# Patient Record
Sex: FEMALE | Race: BLACK OR AFRICAN AMERICAN | NOT HISPANIC OR LATINO | Employment: FULL TIME | URBAN - METROPOLITAN AREA
[De-identification: names, ages, dates, MRNs, and addresses within clinical notes are randomized per-mention and may not be internally consistent; named-entity substitution may affect disease eponyms.]

---

## 2017-03-27 ENCOUNTER — HOSPITAL ENCOUNTER (EMERGENCY)
Facility: CLINIC | Age: 27
Discharge: HOME | End: 2017-03-27
Attending: PHYSICIAN ASSISTANT | Admitting: PHYSICIAN ASSISTANT
Payer: COMMERCIAL

## 2017-03-27 VITALS — SYSTOLIC BLOOD PRESSURE: 127 MMHG | TEMPERATURE: 98.5 F | DIASTOLIC BLOOD PRESSURE: 91 MMHG | OXYGEN SATURATION: 99 %

## 2017-03-27 DIAGNOSIS — A59.01 TRICHOMONAL VAGINITIS: ICD-10-CM

## 2017-03-27 DIAGNOSIS — Z76.0 ENCOUNTER FOR MEDICATION REFILL: ICD-10-CM

## 2017-03-27 LAB
ALBUMIN UR-MCNC: NEGATIVE MG/DL
APPEARANCE UR: ABNORMAL
BACTERIA #/AREA URNS HPF: ABNORMAL /HPF
BILIRUB UR QL STRIP: NEGATIVE
COLOR UR AUTO: ABNORMAL
GLUCOSE UR STRIP-MCNC: NEGATIVE MG/DL
HCG UR QL: NEGATIVE
HGB UR QL STRIP: ABNORMAL
KETONES UR STRIP-MCNC: NEGATIVE MG/DL
LEUKOCYTE ESTERASE UR QL STRIP: ABNORMAL
MICRO REPORT STATUS: ABNORMAL
NITRATE UR QL: NEGATIVE
PH UR STRIP: 7 PH (ref 5–7)
RBC #/AREA URNS AUTO: 2 /HPF (ref 0–2)
SP GR UR STRIP: 1.01 (ref 1–1.03)
SPECIMEN SOURCE: ABNORMAL
SQUAMOUS #/AREA URNS AUTO: 17 /HPF (ref 0–1)
URN SPEC COLLECT METH UR: ABNORMAL
UROBILINOGEN UR STRIP-MCNC: NORMAL MG/DL (ref 0–2)
WBC #/AREA URNS AUTO: 16 /HPF (ref 0–2)
WET PREP SPEC: ABNORMAL

## 2017-03-27 PROCEDURE — 87210 SMEAR WET MOUNT SALINE/INK: CPT | Performed by: PHYSICIAN ASSISTANT

## 2017-03-27 PROCEDURE — 87591 N.GONORRHOEAE DNA AMP PROB: CPT | Performed by: PHYSICIAN ASSISTANT

## 2017-03-27 PROCEDURE — 96372 THER/PROPH/DIAG INJ SC/IM: CPT

## 2017-03-27 PROCEDURE — 87491 CHLMYD TRACH DNA AMP PROBE: CPT | Performed by: PHYSICIAN ASSISTANT

## 2017-03-27 PROCEDURE — 81001 URINALYSIS AUTO W/SCOPE: CPT | Performed by: PHYSICIAN ASSISTANT

## 2017-03-27 PROCEDURE — 25000128 H RX IP 250 OP 636: Performed by: PHYSICIAN ASSISTANT

## 2017-03-27 PROCEDURE — 81025 URINE PREGNANCY TEST: CPT | Performed by: PHYSICIAN ASSISTANT

## 2017-03-27 PROCEDURE — 25000125 ZZHC RX 250: Performed by: PHYSICIAN ASSISTANT

## 2017-03-27 PROCEDURE — 99284 EMERGENCY DEPT VISIT MOD MDM: CPT | Mod: 25

## 2017-03-27 PROCEDURE — 25000132 ZZH RX MED GY IP 250 OP 250 PS 637: Performed by: PHYSICIAN ASSISTANT

## 2017-03-27 RX ORDER — AZITHROMYCIN 250 MG/1
1000 TABLET, FILM COATED ORAL ONCE
Status: COMPLETED | OUTPATIENT
Start: 2017-03-27 | End: 2017-03-27

## 2017-03-27 RX ORDER — CEFTRIAXONE SODIUM 1 G
250 VIAL (EA) INJECTION ONCE
Status: DISCONTINUED | OUTPATIENT
Start: 2017-03-27 | End: 2017-03-27

## 2017-03-27 RX ORDER — METRONIDAZOLE 500 MG/1
2000 TABLET ORAL ONCE
Status: COMPLETED | OUTPATIENT
Start: 2017-03-27 | End: 2017-03-27

## 2017-03-27 RX ORDER — METRONIDAZOLE 7.5 MG/G
GEL TOPICAL 2 TIMES DAILY
Qty: 45 G | Refills: 0 | Status: SHIPPED | OUTPATIENT
Start: 2017-03-27 | End: 2017-04-24

## 2017-03-27 RX ADMIN — LIDOCAINE HYDROCHLORIDE 250 MG: 10 INJECTION, SOLUTION EPIDURAL; INFILTRATION; INTRACAUDAL; PERINEURAL at 17:09

## 2017-03-27 RX ADMIN — AZITHROMYCIN 1000 MG: 250 TABLET, FILM COATED ORAL at 17:09

## 2017-03-27 RX ADMIN — METRONIDAZOLE 2000 MG: 500 TABLET ORAL at 17:09

## 2017-03-27 ASSESSMENT — ENCOUNTER SYMPTOMS
FLANK PAIN: 0
FEVER: 0
DYSURIA: 0
NAUSEA: 0
BACK PAIN: 0
CHILLS: 0
COUGH: 0
ABDOMINAL PAIN: 0
VOMITING: 0
DIARRHEA: 0
HEMATURIA: 1
DIFFICULTY URINATING: 0

## 2017-03-27 NOTE — ED AVS SNAPSHOT
Emergency Department    [address] [place] [address]    Phone:  [phone]    Fax:  [phone]                                       Elissa Avila   MRN: [id number]    Department:   Emergency Department   Date of Visit:  3/27/2017           Patient Information     Date Of Birth          1990        Your diagnoses for this visit were:     Trichomonal vaginitis     Encounter for medication refill        You were seen by Jannet Chapa PA-C.      Follow-up Information     Follow up with Your clinic. Schedule an appointment as soon as possible for a visit in 1 week.    Why:  If symptoms persist         Discharge Instructions         Trichomonas Vaginal Infection (Trichomoniasis)    You have a Trichomonas vaginal infection. This problem is often called  trich.  It is caused by a parasite that is passed during sex. This makes trich a sexually transmitted disease (STD). Both men and women can get trich, but it is more common in women.  Most people who have trich don t have any symptoms at first. If symptoms do occur, they may take weeks or months to develop.  Symptoms in women can include:    Thin discharge from the vagina that may smell bad and be clear, white, gray, green, or yellow in color    Itching, burning, redness, or soreness in or around the vagina    Pain in the lower belly    Frequent urination or pain and burning during urination    Pain during sex  Symptoms in men are not very common. Symptoms in men are not very common. Men may have trich and pass it to women during sex without knowing they were ever infected.  Trich is most often trated with anbiotics. Without treatment, trich can increase the risk of more serious health problems such as:    Pelvic inflammatory disease (PID)     delivery (giving birth to a baby early if you re pregnant)    HIV and certain other sexually transmitted diseases (STDs)  Home care    Take the antibiotics you re prescribed  exactly as directed. Finish all of the medicine, even if your symptoms go away.    Avoid drinking alcohol until you re done with your treatment.    Tell any partners you have sex with that you have trich. They will need be tested for trich and possibly treated as well.    Avoid having sex until you and any partners you have sex with are confirmed to no longer have trich.  Prevention  The only way to avoid getting trich or any other STD is to avoid having sex. If you choose to have sex, then take steps to lower your health risks:    Use condoms when having sex.    Limit the number of partners you have sex with.    Get tested regularly for STDs. Ask any partner you have sex with to do the same.    Don t have sex with anyone who has symptoms that may be due to an STD.  Follow-up care  Follow up with your healthcare provider, or as advised. Testing will likely be done to ensure that the infection has cleared.  When to seek medical advice  Call your healthcare provider right away if:    You have a fever of 100.4 F (38 C) or higher, or as directed by your provider.    Your symptoms worsen, or they don t go away even after completing your treatment.    You have new pain in the lower belly or pelvic region.    You have side effects that bother you or a reaction to the medicine you re taking.    You or any partners you have sex with have new symptoms, such as a rash, joint pain, or sores.    3785-1854 [employer]. [address] [place] [address]. All rights reserved. This information is not intended as a substitute for professional medical care. Always follow your healthcare professional's instructions.          24 Hour Appointment Hotline       To make an appointment at any [facility] (formerly [facility]3], call [phone] ([phone]). If you don't have a family doctor or clinic, we will help you find one. [facility] are conveniently located to serve the needs of you and your family.             Review  of your medicines      START taking        Dose / Directions Last dose taken    metroNIDAZOLE 0.75 % topical gel   Commonly known as:  METROGEL   Quantity:  45 g        Apply topically 2 times daily For facial rash   Refills:  0          Our records show that you are taking the medicines listed below. If these are incorrect, please call your family doctor or clinic.        Dose / Directions Last dose taken    prenatal multivitamin  plus iron 27-0.8 MG Tabs per tablet   Dose:  1 tablet   Indication:  Pregnancy        Take 1 tablet by mouth daily   Refills:  0                Prescriptions were sent or printed at these locations (1 Prescription)                   Other Prescriptions                Printed at Department/Unit printer (1 of 1)         metroNIDAZOLE (METROGEL) 0.75 % topical gel                Procedures and tests performed during your visit     Chlamydia trachomatis PCR    HCG qualitative urine    Neisseria gonorrhoeae PCR    UA with Microscopic    Wet prep      Orders Needing Specimen Collection     None      Pending Results     Date and Time Order Name Status Description    3/27/2017 1518 Chlamydia trachomatis PCR In process     3/27/2017 1448 Neisseria gonorrhoeae PCR In process             Pending Culture Results     Date and Time Order Name Status Description    3/27/2017 1518 Chlamydia trachomatis PCR In process     3/27/2017 1448 Neisseria gonorrhoeae PCR In process              Test Results from your hospital stay     3/27/2017  3:12 PM - Interface, [facility] Results      Component Results     Component Value Ref Range & Units Status    Color Urine Light Yellow  Final    Appearance Urine Slightly Cloudy  Final    Glucose Urine Negative NEG mg/dL Final    Bilirubin Urine Negative NEG Final    Ketones Urine Negative NEG mg/dL Final    Specific Gravity Urine 1.007 1.003 - 1.035 Final    Blood Urine Trace (A) NEG Final    pH Urine 7.0 5.0 - 7.0 pH Final    Protein Albumin Urine Negative NEG mg/dL Final     Urobilinogen mg/dL Normal 0.0 - 2.0 mg/dL Final    Nitrite Urine Negative NEG Final    Leukocyte Esterase Urine Moderate (A) NEG Final    Source Midstream Urine  Final    WBC Urine 16 (H) 0 - 2 /HPF Final    RBC Urine 2 0 - 2 /HPF Final    Bacteria Urine Few (A) NEG /HPF Final    Squamous Epithelial /HPF Urine 17 (H) 0 - 1 /HPF Final         3/27/2017  4:07 PM - Interface, [employer] Results      Component Results     Component    Specimen Description    Vagina    Wet Prep (Abnormal)    Trichomonas seen  No yeast seen  No clue cells seen  Few PMNs seen      Micro Report Status    FINAL 03/27/2017         3/27/2017  3:06 PM - Interface, [facility] Results      Component Results     Component Value Ref Range & Units Status    HCG Qual Urine Negative NEG Final         3/27/2017  3:42 PM - Interface, [facility] Results         3/27/2017  3:42 PM - Interface, [facility] Results                Clinical Quality Measure: Blood Pressure Screening     Your blood pressure was checked while you were in the emergency department today. The last reading we obtained was  BP: (!) 127/91 . Please read the guidelines below about what these numbers mean and what you should do about them.  If your systolic blood pressure (the top number) is less than 120 and your diastolic blood pressure (the bottom number) is less than 80, then your blood pressure is normal. There is nothing more that you need to do about it.  If your systolic blood pressure (the top number) is 120-139 or your diastolic blood pressure (the bottom number) is 80-89, your blood pressure may be higher than it should be. You should have your blood pressure rechecked within a year by a primary care provider.  If your systolic blood pressure (the top number) is 140 or greater or your diastolic blood pressure (the bottom number) is 90 or greater, you may have high blood pressure. High blood pressure is treatable, but if left untreated over time it can put you at risk for heart  "attack, stroke, or kidney failure. You should have your blood pressure rechecked by a primary care provider within the next 4 weeks.  If your provider in the emergency department today gave you specific instructions to follow-up with your doctor or provider even sooner than that, you should follow that instruction and not wait for up to 4 weeks for your follow-up visit.        Thank you for choosing [facility]       Thank you for choosing [facility] for your care. Our goal is always to provide you with excellent care. Hearing back from our patients is one way we can continue to improve our services. Please take a few minutes to complete the written survey that you may receive in the mail after you visit with us. Thank you!        [employer] Information     [employer] lets you send messages to your doctor, view your test results, renew your prescriptions, schedule appointments and more. To sign up, go to [email or web address] . Click on \"Log in\" on the left side of the screen, which will take you to the Welcome page. Then click on \"Sign up Now\" on the right side of the page.     You will be asked to enter the access code listed below, as well as some personal information. Please follow the directions to create your username and password.     Your access code is: [email or web address]  Expires: 2017  4:47 PM     Your access code will  in 90 days. If you need help or a new code, please call your [facility] clinic or [phone].        Care EveryWhere ID     This is your Care EveryWhere ID. This could be used by other organizations to access your [facility] medical records  [id number]        After Visit Summary       This is your record. Keep this with you and show to your community pharmacist(s) and doctor(s) at your next visit.                  "

## 2017-03-27 NOTE — ED PROVIDER NOTES
History     Chief Complaint:  Medication Refill and Urinary Symptoms      HPI   Elissa Avila is a 26 year old female who presents with urinary symptoms and concern bacterial infection, as well as request for medical refill. The patient reports that she has a history of 3 UTIs, most recently in February of this year when she was started on Macrobid and Pyridium. The patient reports that her symptoms went away after antibiotics. She reports that she has had some urinary symptoms and pain with intercourse over the last few days and is concerned about another infection, prompting her to come to the ED for evaluation. On arrival to the ED, the patient reports that she has been having strong smelling urine, pain with intercourse, and intermittent hematuria. The patient denies any pain or burning with urination. She denies any abdominal pain, flank pain, fever, chills, or vaginal discharge.     The patient also states that she was prescribed a cream for an facial rash (Metrogel) about a month ago at [facility]. She states that the rash was improving but she accidentally lost the prescription while they were moving and needs a refill.    Allergies:  No known drug allergies     Medications:    Lithium     Past Medical History:    ASCUS  Depression    Past Surgical History:    History reviewed. No pertinent surgical history.    Family History:    History reviewed. No pertinent family history.     Social History:  Smoking status: Current some day smoker  Alcohol use: No  Marital Status:  Single [1]     Review of Systems   Constitutional: Negative for chills and fever.   Respiratory: Negative for cough.    Gastrointestinal: Negative for abdominal pain, diarrhea, nausea and vomiting.   Genitourinary: Positive for dyspareunia and hematuria. Negative for difficulty urinating, dysuria, flank pain, pelvic pain, vaginal discharge and vaginal pain.        Strong smelling urine    Musculoskeletal: Negative for back  pain.   Skin: Negative for rash.   All other systems reviewed and are negative.      Physical Exam     Patient Vitals for the past 24 hrs:   BP Temp Temp src Heart Rate SpO2   03/27/17 1433 (!) 127/91 98.5  F (36.9  C) Oral 98 99 %     Physical Exam  General: Alert, interactive, appears comfortable    Eye:  Pupils are equal, round, and reactive.      ENT:     No rhinorrhea.     Moist mucus membranes.      Neck: No rigidity.              Cardiac:  Regular rate and rhythm. S1, S2.  No murmurs, gallops, or rubs.    Pulmonary:  Clear to auscultation bilaterally.  No wheezes or crackles.             Non labored. No use of accessory muscles.     Abdomen:  Abdomen is soft and non-distended, without focal tenderness.     Pelvic exam: no external genitalia lesions. Small amount of creamy odorous vaginal discharge in the vaginal vault. No CMT or adnexal tenderness on bimanual exam.     Musculoskeletal:  Freely moveable extremities    Skin:  Warm and dry. Slightly raised skin colored rash on the left zygomatic region. No vesicular lesions or erythema.     Neurologic:  Non-focal exam without asymmetric weakness or numbness.  GCS 15    Psychiatric:  Awake. Normal affect with appropriate interaction with examiner.      Emergency Department Course   Laboratory:  UA: Blood trace, Leukocyte esterase moderate, WBC 16(H), Bacteria few, Squamous epithelial 17(H), o/w negative  HCG qual: Negative     Wet prep: Trichomonas seen, no yeast seen, no clue cells seen, Few PMNs seen.   Gonorrhea: in process  Chlamydia: in process    Interventions:  1709: Flagyl 2,000 mg oral  1709: Azithromycin 1,000 mg oral  1709: Rocephin 250 mg IM    Emergency Department Course:  Past medical records, nursing notes, and vitals reviewed.  1443: I performed an exam of the patient and obtained history, as documented above.  UA sent and pelvic exam performed, results above.     1533: I rechecked the patient. Explained findings to the patient.  1651: I rechecked  the patient. Explained findings to the patient.    I rechecked the patient.  Findings and plan explained to the Patient. Patient discharged home with instructions regarding supportive care, medications, and reasons to return. The importance of close follow-up was reviewed.     Impression & Plan      Medical Decision Making:  This is a 26 year old female who presents for evaluation of urinary symptoms and request of a medication refill and found to be positive for vaginal trichomonas. On exam, the patient is afebrile and clinically well appearing. Given that she reported pelvic pain with intercourse I did complete a pelvic exam on which there was an odorous visible discharge present in the vaginal canal. Wet prep is positive for trichomonas. Given this I recommended treatment for this patient also including gonorrhea and chlamydia as she states she was unaware that she had been exposed. The patient was given Flagyl, Azithromycin, and Ceftriaxone. Formal gonorrhea and chlamydia testing are currently pending. Her urine analysis appears contaminated with squamous cells and at this point I would not treat her urinalysis given her other findings. The patient is not pregnant. At this point there does not appear to be complications such as PID or underlying sepsis. I told the patient she should remain abstinent for 2 weeks and her partner should be treated as well. I also told her that I also recommend getting tested for HIV and hepatitis as these are also sexually transmitted infection but not tested for here in the Emergency Department. Finally, the patient also requested a refill of her topical Metronidazole which I gave her a refill for. Per care everywhere she received treatment for this for suspected rosacea. The patient is discharged home.     Diagnosis:    ICD-10-CM   1. Trichomonal vaginitis A59.01   2. Encounter for medication refill Z76.0     Disposition: Discharged to home    Discharge Medications:   Details    metroNIDAZOLE (METROGEL) 0.75 % topical gel Apply topically 2 times daily For facial rashDisp-45 g, R-0Local Print     Veronica Angeles  3/27/2017    EMERGENCY DEPARTMENT    I, Veronica Angeles, am serving as a scribe at 2:43 PM on 3/27/2017 to document services personally performed by Jannet Chapa PA based on my observations and the provider's statements to me.        Jannet Chapa PA-MARGARITA  03/27/17 2032

## 2017-03-27 NOTE — DISCHARGE INSTRUCTIONS
Trichomonas Vaginal Infection (Trichomoniasis)    You have a Trichomonas vaginal infection. This problem is often called  trich.  It is caused by a parasite that is passed during sex. This makes trich a sexually transmitted disease (STD). Both men and women can get trich, but it is more common in women.  Most people who have trich don t have any symptoms at first. If symptoms do occur, they may take weeks or months to develop.  Symptoms in women can include:    Thin discharge from the vagina that may smell bad and be clear, white, gray, green, or yellow in color    Itching, burning, redness, or soreness in or around the vagina    Pain in the lower belly    Frequent urination or pain and burning during urination    Pain during sex  Symptoms in men are not very common. Symptoms in men are not very common. Men may have trich and pass it to women during sex without knowing they were ever infected.  Trich is most often trated with anbiotics. Without treatment, trich can increase the risk of more serious health problems such as:    Pelvic inflammatory disease (PID)     delivery (giving birth to a baby early if you re pregnant)    HIV and certain other sexually transmitted diseases (STDs)  Home care    Take the antibiotics you re prescribed exactly as directed. Finish all of the medicine, even if your symptoms go away.    Avoid drinking alcohol until you re done with your treatment.    Tell any partners you have sex with that you have trich. They will need be tested for trich and possibly treated as well.    Avoid having sex until you and any partners you have sex with are confirmed to no longer have trich.  Prevention  The only way to avoid getting trich or any other STD is to avoid having sex. If you choose to have sex, then take steps to lower your health risks:    Use condoms when having sex.    Limit the number of partners you have sex with.    Get tested regularly for STDs. Ask any partner you have sex with  to do the same.    Don t have sex with anyone who has symptoms that may be due to an STD.  Follow-up care  Follow up with your healthcare provider, or as advised. Testing will likely be done to ensure that the infection has cleared.  When to seek medical advice  Call your healthcare provider right away if:    You have a fever of 100.4 F (38 C) or higher, or as directed by your provider.    Your symptoms worsen, or they don t go away even after completing your treatment.    You have new pain in the lower belly or pelvic region.    You have side effects that bother you or a reaction to the medicine you re taking.    You or any partners you have sex with have new symptoms, such as a rash, joint pain, or sores.    9431-0465 [employer]. [address] [place] [address], [place] [address]. All rights reserved. This information is not intended as a substitute for professional medical care. Always follow your healthcare professional's instructions.

## 2017-03-27 NOTE — ED AVS SNAPSHOT
Emergency Department    [address] [place] [address]    Phone:  [phone]    Fax:  [phone]                                       Elissa Avila   MRN: [id number]    Department:   Emergency Department   Date of Visit:  3/27/2017           After Visit Summary Signature Page     I have received my discharge instructions, and my questions have been answered. I have discussed any challenges I see with this plan with the nurse or doctor.    ..........................................................................................................................................  Patient/Patient Representative Signature      ..........................................................................................................................................  Patient Representative Print Name and Relationship to Patient    ..................................................               ................................................  Date                                            Time    ..........................................................................................................................................  Reviewed by Signature/Title    ...................................................              ..............................................  Date                                                            Time

## 2017-03-28 LAB
C TRACH DNA SPEC QL NAA+PROBE: NORMAL
N GONORRHOEA DNA SPEC QL NAA+PROBE: NORMAL
SPECIMEN SOURCE: NORMAL
SPECIMEN SOURCE: NORMAL

## 2017-04-14 ENCOUNTER — APPOINTMENT (OUTPATIENT)
Dept: CT IMAGING | Facility: CLINIC | Age: 27
End: 2017-04-14
Attending: EMERGENCY MEDICINE
Payer: COMMERCIAL

## 2017-04-14 ENCOUNTER — HOSPITAL ENCOUNTER (EMERGENCY)
Facility: CLINIC | Age: 27
Discharge: HOME | End: 2017-04-14
Attending: EMERGENCY MEDICINE | Admitting: EMERGENCY MEDICINE
Payer: COMMERCIAL

## 2017-04-14 VITALS
BODY MASS INDEX: 27.6 KG/M2 | OXYGEN SATURATION: 100 % | SYSTOLIC BLOOD PRESSURE: 112 MMHG | HEART RATE: 72 BPM | RESPIRATION RATE: 18 BRPM | WEIGHT: 150 LBS | TEMPERATURE: 98.8 F | HEIGHT: 62 IN | DIASTOLIC BLOOD PRESSURE: 77 MMHG

## 2017-04-14 DIAGNOSIS — K02.9 DENTAL CARIES: ICD-10-CM

## 2017-04-14 DIAGNOSIS — R59.9 REACTIVE LYMPHADENOPATHY: ICD-10-CM

## 2017-04-14 DIAGNOSIS — R22.0 SUBMANDIBULAR SWELLING: ICD-10-CM

## 2017-04-14 DIAGNOSIS — R22.1 SUBMANDIBULAR SWELLING: ICD-10-CM

## 2017-04-14 PROCEDURE — 99285 EMERGENCY DEPT VISIT HI MDM: CPT | Mod: 25

## 2017-04-14 PROCEDURE — 25000125 ZZHC RX 250: Performed by: EMERGENCY MEDICINE

## 2017-04-14 PROCEDURE — 70487 CT MAXILLOFACIAL W/DYE: CPT

## 2017-04-14 PROCEDURE — 96374 THER/PROPH/DIAG INJ IV PUSH: CPT | Mod: 59

## 2017-04-14 PROCEDURE — 25000128 H RX IP 250 OP 636

## 2017-04-14 PROCEDURE — 25500064 ZZH RX 255 OP 636: Performed by: EMERGENCY MEDICINE

## 2017-04-14 RX ORDER — IOPAMIDOL 755 MG/ML
80 INJECTION, SOLUTION INTRAVASCULAR ONCE
Status: COMPLETED | OUTPATIENT
Start: 2017-04-14 | End: 2017-04-14

## 2017-04-14 RX ORDER — DIPHENHYDRAMINE HYDROCHLORIDE 50 MG/ML
INJECTION INTRAMUSCULAR; INTRAVENOUS
Status: COMPLETED
Start: 2017-04-14 | End: 2017-04-14

## 2017-04-14 RX ORDER — DIPHENHYDRAMINE HYDROCHLORIDE 50 MG/ML
50 INJECTION INTRAMUSCULAR; INTRAVENOUS ONCE
Status: COMPLETED | OUTPATIENT
Start: 2017-04-14 | End: 2017-04-14

## 2017-04-14 RX ADMIN — DIPHENHYDRAMINE HYDROCHLORIDE 50 MG: 50 INJECTION INTRAMUSCULAR; INTRAVENOUS at 15:07

## 2017-04-14 RX ADMIN — IOPAMIDOL 80 ML: 755 INJECTION, SOLUTION INTRAVENOUS at 15:00

## 2017-04-14 RX ADMIN — SODIUM CHLORIDE 60 ML: 9 INJECTION, SOLUTION INTRAVENOUS at 15:00

## 2017-04-14 RX ADMIN — DIPHENHYDRAMINE HYDROCHLORIDE 50 MG: 50 INJECTION, SOLUTION INTRAMUSCULAR; INTRAVENOUS at 15:07

## 2017-04-14 ASSESSMENT — ENCOUNTER SYMPTOMS
NAUSEA: 0
VOMITING: 0
FEVER: 0

## 2017-04-14 NOTE — ED AVS SNAPSHOT
Emergency Department    [address] [facility] [address]    Phone:  [phone]    Fax:  [phone]                                       Elissa Avila   MRN: [id number]    Department:   Emergency Department   Date of Visit:  4/14/2017           Patient Information     Date Of Birth          1990        Your diagnoses for this visit were:     Reactive lymphadenopathy     Submandibular swelling     Dental caries        You were seen by Samantha Rose MD.      Follow-up Information     Follow up with your dentist.        Follow up with  Emergency Department.    Specialty:  EMERGENCY MEDICINE    Why:  If symptoms worsen    Contact information:    [address] [facility] [id number] [phone]        Discharge Instructions         Lymphadenopathy  Lymphadenopathy is swelling of the lymph nodes. Lymph nodes are small, bean-shaped glands around the body.  What are lymph nodes?  Lymph nodes are part of your immune system. The glands are found in your neck, armpits, groin, chest, and abdomen. They act as filters for lymph fluid as it flows through your body. Lymph fluid contains white blood cells and other things that fight infection.  Why lymph nodes swell  Lymphadenopathy is very common. The glands often enlarge during a viral or bacterial infection. It can happen during a cold, the flu, or strep throat. The nodes may swell in just one area of the body, such as the neck (localized). Or nodes may swell all over the body (generalized). The neck (cervical) lymph nodes are the most common site of lymphadenopathy.  What causes lymphadenopathy?  Dead cells and fluid build up in the lymph nodes as they help fight infection or disease. This causes them to swell in size. Enlarged lymph nodes are often near the source of infection. This can help to find the cause of an infection. For example, swollen lymph nodes around the jaw may be because of an infection in the teeth  or mouth. But lymphadenopathy may also be generalized. This is common in some viral illnesses such as mononucleosis or chickenpox (varicella).  Lymphadenopathy can also be caused by:    Infection of a lymph node or small group of nodes (lymphadenitis)    Cancer    Reactions to medicines such as antibiotics and some seizure medicines    Other health conditions, such as lupus  Symptoms of lymphadenopathy  Lymphadenopathy can cause symptoms such as:    Lumps under the jaw, on the sides or back of the neck, in the armpits, in the groin, or in the chest or belly (abdomen)    Pain or tenderness in any of these areas    Redness or warmth in any of these areas  You may also have symptoms from an infection causing the swollen glands. These symptoms may include fever, sore throat, body aches, or cough.  Diagnosing lymphadenopathy  Your health care provider will ask about your health history and symptoms. He or she will give you a physical exam and check the areas where lymph nodes are enlarged. Your health care provider will check the size and location of the nodes, and ask how long they have been swollen and if they are painful. Diagnostic tests and referral to specialists may be recommended. They may include:    Blood tests. These are done to check for signs of infection and other problems.    Urine test. This is also done to check for infection and other problems.    Chest X-ray. This test can show enlarged lymph nodes or other problems.    Lymph node biopsy. If lymph nodes are swollen for 3 to 4 weeks, they may be checked with a biopsy. Small samples of lymph node tissue are taken and checked in a lab for signs of cancer. You may be referred to a specialist in blood disorders and cancer (hematologist and oncologist).  Treatment for lymphadenopathy  The treatment of enlarged lymph nodes depends on the cause. Enlarged lymph nodes are often harmless and go away without any treatment. Treatment is most often done on the cause  of the enlarged nodes and may include:    Antibiotic medicine to treat a bacterial infection    Incision and drainage (I & D) of a lymph node for lymphadenitis    Other medicines or procedures to treat the cause of the enlarged nodes  You may need follow-up exam in 3 to 4 weeks to recheck enlarged nodes.     When to call your health care provider  Call your health care provider if you have lymph nodes that are still swollen after 3 to 4 weeks.        0622-2902 [employer]. [address] [place] [address], [place] [address]. All rights reserved. This information is not intended as a substitute for professional medical care. Always follow your healthcare professional's instructions.          24 Hour Appointment Hotline       To make an appointment at any [facility], call [phone] ([phone]). If you don't have a family doctor or clinic, we will help you find one. [facility] clinics are conveniently located to serve the needs of you and your family.             Review of your medicines      START taking        Dose / Directions Last dose taken    amoxicillin-clavulanate 875-125 MG per tablet   Commonly known as:  AUGMENTIN   Dose:  1 tablet   Quantity:  20 tablet        Take 1 tablet by mouth 2 times daily   Refills:  0          Our records show that you are taking the medicines listed below. If these are incorrect, please call your family doctor or clinic.        Dose / Directions Last dose taken    metroNIDAZOLE 0.75 % topical gel   Commonly known as:  METROGEL   Quantity:  45 g        Apply topically 2 times daily For facial rash   Refills:  0        prenatal multivitamin  plus iron 27-0.8 MG Tabs per tablet   Dose:  1 tablet   Indication:  Pregnancy        Take 1 tablet by mouth daily   Refills:  0                Prescriptions were sent or printed at these locations (1 Prescription)                   Other Prescriptions                Printed at Department/Unit printer (1 of 1)          amoxicillin-clavulanate (AUGMENTIN) 875-125 MG per tablet                Procedures and tests performed during your visit     CT Maxillofacial w Contrast      Orders Needing Specimen Collection     None      Pending Results     Date and Time Order Name Status Description    4/14/2017 1359 CT Maxillofacial w Contrast Preliminary             Pending Culture Results     No orders found from 4/12/2017 to 4/15/2017.            Test Results From Your Hospital Stay        4/14/2017  3:18 PM      Narrative     CT MAXILLOFACIAL WITH CONTRAST 4/14/2017 3:01 PM    HISTORY: Right submandibular swelling and pain.    COMPARISON: None.    TECHNIQUE: CT soft tissue neck with 80 mL Isovue-370. Radiation dose  for this scan was reduced using automated exposure control, adjustment  of the mA and/or kV according to patient size, or iterative  reconstruction technique.    FINDINGS: Visualized sinuses are clear. Normal nasopharynx,  oropharynx, and hypopharynx. Parotid glands appear normal.  Submandibular glands appear normal and symmetric. There are 1 or 2  slightly prominent lymph nodes in the right submandibular region just  anterior to the gland which could be reactive but there is no fluid  collection or evidence for abscess.    The thyroid gland is diffusely enlarged bilaterally without focal  nodularity. Inferior aspect of the thyroid glands are not entirely  included on the study.        Impression     IMPRESSION:   1. There are a few slightly prominent lymph nodes adjacent to the  right submandibular gland, but the submandibular glands appear  symmetric and normal.  2. No fluid collection or evidence for abscess.  3. Prominent thyroid gland without nodularity.                Clinical Quality Measure: Blood Pressure Screening     Your blood pressure was checked while you were in the emergency department today. The last reading we obtained was  BP: 112/77 . Please read the guidelines below about what these numbers mean and what  "you should do about them.  If your systolic blood pressure (the top number) is less than 120 and your diastolic blood pressure (the bottom number) is less than 80, then your blood pressure is normal. There is nothing more that you need to do about it.  If your systolic blood pressure (the top number) is 120-139 or your diastolic blood pressure (the bottom number) is 80-89, your blood pressure may be higher than it should be. You should have your blood pressure rechecked within a year by a primary care provider.  If your systolic blood pressure (the top number) is 140 or greater or your diastolic blood pressure (the bottom number) is 90 or greater, you may have high blood pressure. High blood pressure is treatable, but if left untreated over time it can put you at risk for heart attack, stroke, or kidney failure. You should have your blood pressure rechecked by a primary care provider within the next 4 weeks.  If your provider in the emergency department today gave you specific instructions to follow-up with your doctor or provider even sooner than that, you should follow that instruction and not wait for up to 4 weeks for your follow-up visit.        Thank you for choosing [facility]       Thank you for choosing [facility] for your care. Our goal is always to provide you with excellent care. Hearing back from our patients is one way we can continue to improve our services. Please take a few minutes to complete the written survey that you may receive in the mail after you visit with us. Thank you!        [facility] Information     [employer] lets you send messages to your doctor, view your test results, renew your prescriptions, schedule appointments and more. To sign up, go to [email or web address] . Click on \"Log in\" on the left side of the screen, which will take you to the Welcome page. Then click on \"Sign up Now\" on the right side of the page.     You will be asked to enter the access code listed below, as well as some " personal information. Please follow the directions to create your username and password.     Your access code is: [email or web address]  Expires: 2017  4:47 PM     Your access code will  in 90 days. If you need help or a new code, please call your [facility] clinic or [phone].        Care EveryWhere ID     This is your Care EveryWhere ID. This could be used by other organizations to access your [facility] medical records  [id number]        After Visit Summary       This is your record. Keep this with you and show to your community pharmacist(s) and doctor(s) at your next visit.

## 2017-04-14 NOTE — DISCHARGE INSTRUCTIONS
Lymphadenopathy  Lymphadenopathy is swelling of the lymph nodes. Lymph nodes are small, bean-shaped glands around the body.  What are lymph nodes?  Lymph nodes are part of your immune system. The glands are found in your neck, armpits, groin, chest, and abdomen. They act as filters for lymph fluid as it flows through your body. Lymph fluid contains white blood cells and other things that fight infection.  Why lymph nodes swell  Lymphadenopathy is very common. The glands often enlarge during a viral or bacterial infection. It can happen during a cold, the flu, or strep throat. The nodes may swell in just one area of the body, such as the neck (localized). Or nodes may swell all over the body (generalized). The neck (cervical) lymph nodes are the most common site of lymphadenopathy.  What causes lymphadenopathy?  Dead cells and fluid build up in the lymph nodes as they help fight infection or disease. This causes them to swell in size. Enlarged lymph nodes are often near the source of infection. This can help to find the cause of an infection. For example, swollen lymph nodes around the jaw may be because of an infection in the teeth or mouth. But lymphadenopathy may also be generalized. This is common in some viral illnesses such as mononucleosis or chickenpox (varicella).  Lymphadenopathy can also be caused by:    Infection of a lymph node or small group of nodes (lymphadenitis)    Cancer    Reactions to medicines such as antibiotics and some seizure medicines    Other health conditions, such as lupus  Symptoms of lymphadenopathy  Lymphadenopathy can cause symptoms such as:    Lumps under the jaw, on the sides or back of the neck, in the armpits, in the groin, or in the chest or belly (abdomen)    Pain or tenderness in any of these areas    Redness or warmth in any of these areas  You may also have symptoms from an infection causing the swollen glands. These symptoms may include fever, sore throat, body aches, or  cough.  Diagnosing lymphadenopathy  Your health care provider will ask about your health history and symptoms. He or she will give you a physical exam and check the areas where lymph nodes are enlarged. Your health care provider will check the size and location of the nodes, and ask how long they have been swollen and if they are painful. Diagnostic tests and referral to specialists may be recommended. They may include:    Blood tests. These are done to check for signs of infection and other problems.    Urine test. This is also done to check for infection and other problems.    Chest X-ray. This test can show enlarged lymph nodes or other problems.    Lymph node biopsy. If lymph nodes are swollen for 3 to 4 weeks, they may be checked with a biopsy. Small samples of lymph node tissue are taken and checked in a lab for signs of cancer. You may be referred to a specialist in blood disorders and cancer (hematologist and oncologist).  Treatment for lymphadenopathy  The treatment of enlarged lymph nodes depends on the cause. Enlarged lymph nodes are often harmless and go away without any treatment. Treatment is most often done on the cause of the enlarged nodes and may include:    Antibiotic medicine to treat a bacterial infection    Incision and drainage (I & D) of a lymph node for lymphadenitis    Other medicines or procedures to treat the cause of the enlarged nodes  You may need follow-up exam in 3 to 4 weeks to recheck enlarged nodes.     When to call your health care provider  Call your health care provider if you have lymph nodes that are still swollen after 3 to 4 weeks.        7577-1751 [employer]. [address] [place] [address]. All rights reserved. This information is not intended as a substitute for professional medical care. Always follow your healthcare professional's instructions.

## 2017-04-14 NOTE — ED AVS SNAPSHOT
Emergency Department    [address] [place] [address]    Phone:  [phone]    Fax:  [phone]                                       Elissa Avila   MRN: [id number]    Department:   Emergency Department   Date of Visit:  4/14/2017           After Visit Summary Signature Page     I have received my discharge instructions, and my questions have been answered. I have discussed any challenges I see with this plan with the nurse or doctor.    ..........................................................................................................................................  Patient/Patient Representative Signature      ..........................................................................................................................................  Patient Representative Print Name and Relationship to Patient    ..................................................               ................................................  Date                                            Time    ..........................................................................................................................................  Reviewed by Signature/Title    ...................................................              ..............................................  Date                                                            Time

## 2017-04-14 NOTE — ED NOTES
Pt back from CT, vomited in CT after the dye given. Some swelling to under rt eye now, informed Dr Rose, sats stable, no sob, no tongue or oral swelling. Benadryl given IV.

## 2017-04-14 NOTE — ED PROVIDER NOTES
"  History     Chief Complaint:  Oral Swelling     HPI   Elissa Avila is a 27 year old female who presents to the emergency department today for evaluation of oral swelling. The patient presents with 2 days of swelling and pain to her right submandibular region. No real dental pain. She denies any fevers, nausea or vomiting. The patient does note a slight cold last week. Negative pregnancy test last week, period less than a month ago and she is not worried she is pregnant.     Allergies:  No Known Drug Allergies     Medications:    Metrogel   Prenatal Multivitamin Plus Iron    Past Medical History:    ASCUS with positive high risk HPV  Depression   Pap smear of cervix with low grade squamous intraepithelial lesion     Past Surgical History:    History reviewed. No pertinent past surgical history.    Family History:    Maternal Grandmother - CAD, CHF, Hypertension  Maternal Grandfather - Hypertension, CAD  Paternal Grandmother - Hypertension    Social History:  The patient was accompanied to the ED by significant other and children.  Smoking Status: Current Some Day Smoker  Alcohol Use: Negative  Marital Status:  Single [1]     Review of Systems   Constitutional: Negative for fever.   HENT: Negative for dental problem.         Right sided oral swelling   Gastrointestinal: Negative for nausea and vomiting.   All other systems reviewed and are negative.    Physical Exam   Vitals:   Patient Vitals for the past 24 hrs:   BP Temp Temp src Heart Rate Resp SpO2 Height Weight   04/14/17 1512 - - - - - 100 % - -   04/14/17 1501 - - - - - 100 % - -   04/14/17 1322 132/68 98.8  F (37.1  C) Oral 70 16 100 % 1.575 m (5' 2\") 68 kg (150 lb)       Physical Exam  General: Patient is alert and normal appearing.  HEENT: Head atraumatic.  Right submandibular tender swelling   Eyes: pupils equal and reactive. Conjunctiva clear   Nares: patent   Oropharynx: no lesions, uvula midline, no palatal draping, normal voice, no trismus, no " difficulty handling secretions  Neck: Supple without lymphadenopathy, no meningismus  Chest: Heart regular rate and rhythm.   Lungs: Equal clear to auscultation with no wheeze or rales  Abdomen: Soft, non tender, nondistended, normal bowel sounds  Back: No costovertebral angle tenderness, no midline C, T or L spine tenderness  Neuro: Grossly nonfocal, normal speech, strength equal bilaterally, CN 2-12 intact  Extremities: No deformities, equal radial and DP pulses. No clubbing, cyanosis.  No edema  Skin: Warm and dry with no rash.       Emergency Department Course     Imaging:  Radiology findings were communicated with the patient who voiced understanding of the findings.    CT Maxillofacial w Contrast:   IMPRESSION:   1. There are a few slightly prominent lymph nodes adjacent to the  right submandibular gland, but the submandibular glands appear  symmetric and normal.  2. No fluid collection or evidence for abscess.  3. Prominent thyroid gland without nodularity.  Reading per radiology    Interventions:   1507 Benadryl 50 mg IV    Emergency Department Course:  Nursing notes and vitals reviewed.  I performed an exam of the patient as documented above.   The patient was sent for a CT while in the emergency department, results above.   At 1530 the patient was rechecked and was updated on the results of her imaging studies.   I discussed the treatment plan with the patient. They expressed understanding of this plan and consented to discharge. They will be discharged home with instructions for care and follow up. In addition, the patient will return to the emergency department if their symptoms persist, worsen, if new symptoms arise or if there is any concern.  All questions were answered.  I personally reviewed the imaging results with the patient and answered all related questions prior to discharge.    Impression & Plan      Medical Decision Making:  Elissa Avila is a 27 year old female who presents to the  emergency department with complaint of right submandibular swelling. She does have some intermittent dental pain but does not have any pain today. She has no trismus. No odynophagia. Here she is tender swelling submandibular. CT does not show any evidence of abscess. There's no airway impingement and no evidence of submandibular gland changes on CT.  CT with prominent lymph nodes adjacent to right submandibular gland.  This is likely reactive lymphadenopathy to poor dentition.  Although no obvious dental abscess at this time.  I will place patient on a course of Augmentin.  If swelling worsens she should return to ER.  If fails to improve with antibiotics need repeat evaluation and possible biopsy. I think she's safe for discharge at this time from an airway and swelling standpoint. I will place her on Augmentin for 10 days and have her follow up with the dentist. Return precautions to the emergency department were reviewed. Patient expressed agreement and understanding of the plans. All questions, concerns addressed.     Diagnosis:    ICD-10-CM    1. Reactive lymphadenopathy R59.9    2. Submandibular swelling R22.0     R22.1    3. Dental caries K02.9      Disposition:   Discharge to home    Discharge Medications:  New Prescriptions    AMOXICILLIN-CLAVULANATE (AUGMENTIN) 875-125 MG PER TABLET    Take 1 tablet by mouth 2 times daily     Scribe Disclosure:  I, Hollie Gutierrez, am serving as a scribe at 1:43 PM on 4/14/2017 to document services personally performed by Samantha Rose MD, based on my observations and the provider's statements to me.    4/14/2017    EMERGENCY DEPARTMENT       Samantha Rose MD  04/14/17 1955

## 2017-04-14 NOTE — ED NOTES
Pt discharged home in stable condition.  Discharge instructions given and understood.  Prescription given to pt at time of discharge.

## 2017-04-15 ENCOUNTER — HOSPITAL ENCOUNTER (EMERGENCY)
Facility: CLINIC | Age: 27
Discharge: HOME | End: 2017-04-15
Attending: EMERGENCY MEDICINE | Admitting: EMERGENCY MEDICINE
Payer: COMMERCIAL

## 2017-04-15 VITALS
RESPIRATION RATE: 16 BRPM | TEMPERATURE: 98.5 F | SYSTOLIC BLOOD PRESSURE: 126 MMHG | HEART RATE: 83 BPM | DIASTOLIC BLOOD PRESSURE: 79 MMHG | OXYGEN SATURATION: 99 %

## 2017-04-15 DIAGNOSIS — R59.0 REACTIVE CERVICAL LYMPHADENOPATHY: ICD-10-CM

## 2017-04-15 DIAGNOSIS — K08.9 POOR DENTITION: ICD-10-CM

## 2017-04-15 PROCEDURE — 99282 EMERGENCY DEPT VISIT SF MDM: CPT

## 2017-04-15 ASSESSMENT — ENCOUNTER SYMPTOMS
RHINORRHEA: 0
TROUBLE SWALLOWING: 0
FEVER: 0
SORE THROAT: 1
SHORTNESS OF BREATH: 0
CHILLS: 0
HEADACHES: 0
FATIGUE: 1
COUGH: 0
FACIAL SWELLING: 1

## 2017-04-15 NOTE — ED PROVIDER NOTES
History     Chief Complaint:  Dental Pain and Swelling     HPI   Elissa Avila is an otherwise healthy 27 year old female who presents with dental pain and submandibular swelling. The patient reports that she first noticed swelling under her right jaw 3 days ago. She was seen at [facility] yesterday, had a CT Maxillofacial as shown below, and was discharged home on a 10 day course of Augmentin. The patient reports that she is not feeling well and was told to return if she wasn't feeling well, prompting her visit today. On arrival to the ED, the patient notes swelling under her right jaw and states she has a mild sore throat as well. She states that she feels generally unwell. Patient also notes pain in her lower right tooth. The patient reports she was last seen by a dentist last year and was supposed to have some of her teeth fixed but was unable to follow up due to family issues. The patient denies any fever, chills, vomiting, cough, difficulty swallowing her secretions, or any other swollen lymph nodes. She states she took her antibiotics yesterday but has not taken it today. No one else at home is sick. Patient does not have a regular dentist but was given follow up at [facility] yesterday and is planning on calling them Monday.     CT Maxillofacial w Contrast from 4/14   IMPRESSION:   1. There are a few slightly prominent lymph nodes adjacent to the  right submandibular gland, but the submandibular glands appear  symmetric and normal.  2. No fluid collection or evidence for abscess.  3. Prominent thyroid gland without nodularity.  Reading per radiology    Allergies:  Contrast dye     Medications:    Lithium  Augmentin  Metronidazole     Past Medical History:    Anxiety  ASCUS  Depression    Past Surgical History:    History reviewed. No pertinent surgical history.    Family History:    History reviewed. No pertinent family history.     Social History:  Smoking status: Current some day  smoker  Alcohol use: No  Marital Status:  Single [1]     Review of Systems   Constitutional: Positive for fatigue. Negative for chills and fever.   HENT: Positive for dental problem, facial swelling and sore throat. Negative for congestion, drooling, rhinorrhea and trouble swallowing.    Respiratory: Negative for cough and shortness of breath.    Skin: Negative for rash.   Neurological: Negative for headaches.   All other systems reviewed and are negative.      Physical Exam   Patient Vitals for the past 24 hrs:   BP Temp Temp src Pulse Heart Rate Resp SpO2   04/15/17 0842 126/79 98.5  F (36.9  C) Oral 83 83 16 99 %       Physical Exam   General: Patient is alert and cooperative.  HENT: Normal tongue, posterior pharynx.  Poor dentition.  Heavily decayed right lower rear molar, with no clinical odontogenic abscess.  Eyes: Normal conjunctiva.  Neck: Normal range of motion. Neck supple.  Lymphatic: tender right submandibular lymph node.  None noted elsewhere, including supraclavicular or axillary.   Cardiovascular: Normal rate.  Pulmonary/Chest: Effort normal.  Abdominal: There is no tenderness.   Musculoskeletal: Normal range of motion.   Neurological: Patient  is alert and oriented.   Skin: Skin is warm and dry. No rash noted.   Psychiatric: Patient has a normal mood and affect. Normal behavior and judgement.      Emergency Department Course   Emergency Department Course:  Past medical records, nursing notes, and vitals reviewed.  2046: I performed an exam of the patient and obtained history, as documented above.    I rechecked the patient.  Findings and plan explained to the Patient. Patient discharged home with instructions regarding supportive care, medications, and reasons to return. The importance of close follow-up was reviewed.     Impression & Plan      Medical Decision Making:  Afebrile 27 year old female returns to the Emergency Room for reassessment of right submandibular swelling and feeling generally  "unwell. She has a longstanding history of poor dentition and was seen yesterday at [facility] for these same symptoms. At that time, her evaluation included a CT scan of the maxillofacial region depicting some mild submandibular lymphadenopathy. There was no abscess or other abnormalities described. This was felt to be reactive lymphadenopathy, possibly from poor dentition and she was prescribed Augmentin and instructed to follow up with dentistry. In the interval time, she has no developed a fever, difficulty swallowing or breathing. Her physical exam reveals tender right submandibular lymphadenopathy with no clinical evidence of development of a dental abscess. She has a normal appearing posterior oropharynx and has no difficulty swallowing or breathing. Reassurance was provided. It seems she didn't clearly understand the CT report, conceding that she was \"out of it\" after receiving medications at [facility]. Reassurance was provided to Elissa. There is no indication for any further testing or interventions. I agree with Augmentin and dentistry follow up.    Diagnosis:    ICD-10-CM   1. Reactive cervical lymphadenopathy R59.0   2. Poor dentition K08.9     Disposition: Discharged to home    Veronica Angeles  4/15/2017   [facility] EMERGENCY DEPARTMENT    I, Veronica Angeles, am serving as a scribe at 8:46 AM on 4/15/2017 to document services personally performed by Anjum Apple MD based on my observations and the provider's statements to me.        Anjum Apple MD  04/16/17 0814    "

## 2017-04-15 NOTE — ED NOTES
Patient discharged to home. Patient received discharge instructions and has no other questions at this time.

## 2017-04-15 NOTE — ED AVS SNAPSHOT
[facility] Emergency Department    [address]    [facility] [address]    Phone:  [phone]    Fax:  [phone]                                       Elissa Avila   MRN: [id number]    Department:  [facility] Emergency Department   Date of Visit:  4/15/2017           After Visit Summary Signature Page     I have received my discharge instructions, and my questions have been answered. I have discussed any challenges I see with this plan with the nurse or doctor.    ..........................................................................................................................................  Patient/Patient Representative Signature      ..........................................................................................................................................  Patient Representative Print Name and Relationship to Patient    ..................................................               ................................................  Date                                            Time    ..........................................................................................................................................  Reviewed by Signature/Title    ...................................................              ..............................................  Date                                                            Time

## 2017-04-15 NOTE — ED NOTES
Seen at [facility] yesterday for right sided jaw/neck/tooth pain. Started antibiotics. Still with pain today. She has not taken her antibiotic today, nor anything for pain.

## 2017-04-15 NOTE — ED AVS SNAPSHOT
[facility] Emergency Department    [address]    [facility] [address]    Phone:  [phone]    Fax:  [phone]                                       Elissa Avila   MRN: [id number]    Department:  [facility] Emergency Department   Date of Visit:  4/15/2017           Patient Information     Date Of Birth          1990        Your diagnoses for this visit were:     Reactive cervical lymphadenopathy     Poor dentition        You were seen by Anjum Apple MD.      Follow-up Information     Follow up with dentistry.    Why:  for re-evaluation of your symptoms next available        Discharge Instructions         Lymphadenopathy  Lymphadenopathy is swelling of the lymph nodes. Lymph nodes are small, bean-shaped glands around the body.  What are lymph nodes?  Lymph nodes are part of your immune system. The glands are found in your neck, armpits, groin, chest, and abdomen. They act as filters for lymph fluid as it flows through your body. Lymph fluid contains white blood cells and other things that fight infection.  Why lymph nodes swell  Lymphadenopathy is very common. The glands often enlarge during a viral or bacterial infection. It can happen during a cold, the flu, or strep throat. The nodes may swell in just one area of the body, such as the neck (localized). Or nodes may swell all over the body (generalized). The neck (cervical) lymph nodes are the most common site of lymphadenopathy.  What causes lymphadenopathy?  Dead cells and fluid build up in the lymph nodes as they help fight infection or disease. This causes them to swell in size. Enlarged lymph nodes are often near the source of infection. This can help to find the cause of an infection. For example, swollen lymph nodes around the jaw may be because of an infection in the teeth or mouth. But lymphadenopathy may also be generalized. This is common in some viral illnesses such as mononucleosis or  chickenpox (varicella).  Lymphadenopathy can also be caused by:    Infection of a lymph node or small group of nodes (lymphadenitis)    Cancer    Reactions to medicines such as antibiotics and some seizure medicines    Other health conditions, such as lupus  Symptoms of lymphadenopathy  Lymphadenopathy can cause symptoms such as:    Lumps under the jaw, on the sides or back of the neck, in the armpits, in the groin, or in the chest or belly (abdomen)    Pain or tenderness in any of these areas    Redness or warmth in any of these areas  You may also have symptoms from an infection causing the swollen glands. These symptoms may include fever, sore throat, body aches, or cough.  Diagnosing lymphadenopathy  Your health care provider will ask about your health history and symptoms. He or she will give you a physical exam and check the areas where lymph nodes are enlarged. Your health care provider will check the size and location of the nodes, and ask how long they have been swollen and if they are painful. Diagnostic tests and referral to specialists may be recommended. They may include:    Blood tests. These are done to check for signs of infection and other problems.    Urine test. This is also done to check for infection and other problems.    Chest X-ray. This test can show enlarged lymph nodes or other problems.    Lymph node biopsy. If lymph nodes are swollen for 3 to 4 weeks, they may be checked with a biopsy. Small samples of lymph node tissue are taken and checked in a lab for signs of cancer. You may be referred to a specialist in blood disorders and cancer (hematologist and oncologist).  Treatment for lymphadenopathy  The treatment of enlarged lymph nodes depends on the cause. Enlarged lymph nodes are often harmless and go away without any treatment. Treatment is most often done on the cause of the enlarged nodes and may include:    Antibiotic medicine to treat a bacterial infection    Incision and drainage  (I & D) of a lymph node for lymphadenitis    Other medicines or procedures to treat the cause of the enlarged nodes  You may need follow-up exam in 3 to 4 weeks to recheck enlarged nodes.     When to call your health care provider  Call your health care provider if you have lymph nodes that are still swollen after 3 to 4 weeks.        5554-8798 [employer]. [address] [place] [address]. All rights reserved. This information is not intended as a substitute for professional medical care. Always follow your healthcare professional's instructions.        Discharge Instructions  Dental Pain    You have been seen today for a toothache. Your pain may be caused by an exposed nerve, an infection (pulpitis), a root abscess, or other problems. You will need to see a dentist for a solution to your tooth problem. Emergency Department care is only to help control your problem until you can see a dentist.  Today, we did not find any sign that your toothache was caused by a serious condition, but sometimes symptoms develop over time and cannot be found during an emergency visit, so it is very important that you follow up with your dentist.      Return to the Emergency Department if:    You develop a fever over 101 degrees Fahrenheit.    You can t open your mouth normally, can t move your tongue well, or can t swallow.    You have new or increased swelling of your face or neck.    You develop drainage of pus or foul smelling material from around your tooth.  What can I do to help myself?    Take any antibiotic the doctor may have prescribed for you today.    Avoid very hot or very cold foods as both can cause pain.    Make an appointment to see a dentist as soon as possible. If you wish, we can provide you with a list of low-cost dental clinics.   If you were given a prescription for medicine here today, be sure to read all of the information (including the package insert) that comes with your  prescription.  This will include important information about the medicine, its side effects, and any warnings that you need to know about.  The pharmacist who fills the prescription can provide more information and answer questions you may have about the medicine.  If you have questions or concerns that the pharmacist cannot address, please call or return to the Emergency Department.   Opioid Medication Information    Pain medications are among the most commonly prescribed medicines, so we are including this information for all our patients. If you did not receive pain medication or get a prescription for pain medicine, you can ignore it.     You may have been given a prescription for an opioid (narcotic) pain medicine and/or have received a pain medicine while here in the Emergency Department. These medicines can make you drowsy or impaired. You must not drive, operate dangerous equipment, or engage in any other dangerous activities while taking these medications. If you drive while taking these medications, you could be arrested for DUI, or driving under the influence. Do not drink any alcohol while you are taking these medications.     Opioid pain medications can cause addiction. If you have a history of chemical dependency of any type, you are at a higher risk of becoming addicted to pain medications.  Only take these prescribed medications to treat your pain when all other options have been tried. Take it for as short a time and as few doses as possible. Store your pain pills in a secure place, as they are frequently stolen and provide a dangerous opportunity for children or visitors in your house to start abusing these powerful medications. We will not replace any lost or stolen medicine.  As soon as your pain is better, you should flush all your remaining medication.     Many prescription pain medications contain Tylenol  (acetaminophen), including Vicodin , Tylenol #3 , Norco , Lortab , and Percocet .  You  should not take any extra pills of Tylenol  if you are using these prescription medications or you can get very sick.  Do not ever take more than 3000 mg of acetaminophen in any 24 hour period.    All opioids tend to cause constipation. Drink plenty of water and eat foods that have a lot of fiber, such as fruits, vegetables, prune juice, apple juice and high fiber cereal.  Take a laxative if you don t move your bowels at least every other day. Miralax , Milk of Magnesia, Colace , or Senna  can be used to keep you regular.      Remember that you can always come back to the Emergency Department if you are not able to see your regular doctor in the amount of time listed above, if you get any new symptoms, or if there is anything that worries you.        24 Hour Appointment Hotline       To make an appointment at any [facility], call [phone] ([phone]). If you don't have a family doctor or clinic, we will help you find one. [facility] clinics are conveniently located to serve the needs of you and your family.             Review of your medicines      Our records show that you are taking the medicines listed below. If these are incorrect, please call your family doctor or clinic.        Dose / Directions Last dose taken    amoxicillin-clavulanate 875-125 MG per tablet   Commonly known as:  AUGMENTIN   Dose:  1 tablet   Quantity:  20 tablet        Take 1 tablet by mouth 2 times daily   Refills:  0        LITHIUM PO        Refills:  0        metroNIDAZOLE 0.75 % topical gel   Commonly known as:  METROGEL   Quantity:  45 g        Apply topically 2 times daily For facial rash   Refills:  0                Orders Needing Specimen Collection     None      Pending Results     No orders found from 4/13/2017 to 4/16/2017.            Pending Culture Results     No orders found from 4/13/2017 to 4/16/2017.            Test Results From Your Hospital Stay               Clinical Quality Measure: Blood Pressure Screening  "    Your blood pressure was checked while you were in the emergency department today. The last reading we obtained was  BP: 126/79 . Please read the guidelines below about what these numbers mean and what you should do about them.  If your systolic blood pressure (the top number) is less than 120 and your diastolic blood pressure (the bottom number) is less than 80, then your blood pressure is normal. There is nothing more that you need to do about it.  If your systolic blood pressure (the top number) is 120-139 or your diastolic blood pressure (the bottom number) is 80-89, your blood pressure may be higher than it should be. You should have your blood pressure rechecked within a year by a primary care provider.  If your systolic blood pressure (the top number) is 140 or greater or your diastolic blood pressure (the bottom number) is 90 or greater, you may have high blood pressure. High blood pressure is treatable, but if left untreated over time it can put you at risk for heart attack, stroke, or kidney failure. You should have your blood pressure rechecked by a primary care provider within the next 4 weeks.  If your provider in the emergency department today gave you specific instructions to follow-up with your doctor or provider even sooner than that, you should follow that instruction and not wait for up to 4 weeks for your follow-up visit.        Thank you for choosing [facility]       Thank you for choosing [facility] for your care. Our goal is always to provide you with excellent care. Hearing back from our patients is one way we can continue to improve our services. Please take a few minutes to complete the written survey that you may receive in the mail after you visit with us. Thank you!        [employer] Information     [employer] lets you send messages to your doctor, view your test results, renew your prescriptions, schedule appointments and more. To sign up, go to [email or web address] . Click on \"Log in\" on " "the left side of the screen, which will take you to the Welcome page. Then click on \"Sign up Now\" on the right side of the page.     You will be asked to enter the access code listed below, as well as some personal information. Please follow the directions to create your username and password.     Your access code is: [email or web address]  Expires: 2017  4:47 PM     Your access code will  in 90 days. If you need help or a new code, please call your [facility] clinic or [phone].        Care EveryWhere ID     This is your Care EveryWhere ID. This could be used by other organizations to access your [facility] medical records  [id number]        After Visit Summary       This is your record. Keep this with you and show to your community pharmacist(s) and doctor(s) at your next visit.                  "

## 2017-04-15 NOTE — ED PROVIDER NOTES
Reviewed CT again.  Patient with dental abscess tooth #31 that has broken through the lingual plate.  Called patient at home to inform her of need for urgent extraction tooth #31.  She confirms no trismus, odynophagia or increase in swelling.  Explained possibility for soft tissue abscess to develop further and if any progressive symptoms to return to ER.  Patient given Oral surgery on-call follow up information and told to call for extractions first thing Monday morning or return to ER this weekend if worsening swelling or develops trismus or odynophagia.  Patient expressed agreement and understanding.       Samantha Rose MD  04/14/17 2008

## 2017-04-15 NOTE — DISCHARGE INSTRUCTIONS
Lymphadenopathy  Lymphadenopathy is swelling of the lymph nodes. Lymph nodes are small, bean-shaped glands around the body.  What are lymph nodes?  Lymph nodes are part of your immune system. The glands are found in your neck, armpits, groin, chest, and abdomen. They act as filters for lymph fluid as it flows through your body. Lymph fluid contains white blood cells and other things that fight infection.  Why lymph nodes swell  Lymphadenopathy is very common. The glands often enlarge during a viral or bacterial infection. It can happen during a cold, the flu, or strep throat. The nodes may swell in just one area of the body, such as the neck (localized). Or nodes may swell all over the body (generalized). The neck (cervical) lymph nodes are the most common site of lymphadenopathy.  What causes lymphadenopathy?  Dead cells and fluid build up in the lymph nodes as they help fight infection or disease. This causes them to swell in size. Enlarged lymph nodes are often near the source of infection. This can help to find the cause of an infection. For example, swollen lymph nodes around the jaw may be because of an infection in the teeth or mouth. But lymphadenopathy may also be generalized. This is common in some viral illnesses such as mononucleosis or chickenpox (varicella).  Lymphadenopathy can also be caused by:    Infection of a lymph node or small group of nodes (lymphadenitis)    Cancer    Reactions to medicines such as antibiotics and some seizure medicines    Other health conditions, such as lupus  Symptoms of lymphadenopathy  Lymphadenopathy can cause symptoms such as:    Lumps under the jaw, on the sides or back of the neck, in the armpits, in the groin, or in the chest or belly (abdomen)    Pain or tenderness in any of these areas    Redness or warmth in any of these areas  You may also have symptoms from an infection causing the swollen glands. These symptoms may include fever, sore throat, body aches, or  cough.  Diagnosing lymphadenopathy  Your health care provider will ask about your health history and symptoms. He or she will give you a physical exam and check the areas where lymph nodes are enlarged. Your health care provider will check the size and location of the nodes, and ask how long they have been swollen and if they are painful. Diagnostic tests and referral to specialists may be recommended. They may include:    Blood tests. These are done to check for signs of infection and other problems.    Urine test. This is also done to check for infection and other problems.    Chest X-ray. This test can show enlarged lymph nodes or other problems.    Lymph node biopsy. If lymph nodes are swollen for 3 to 4 weeks, they may be checked with a biopsy. Small samples of lymph node tissue are taken and checked in a lab for signs of cancer. You may be referred to a specialist in blood disorders and cancer (hematologist and oncologist).  Treatment for lymphadenopathy  The treatment of enlarged lymph nodes depends on the cause. Enlarged lymph nodes are often harmless and go away without any treatment. Treatment is most often done on the cause of the enlarged nodes and may include:    Antibiotic medicine to treat a bacterial infection    Incision and drainage (I & D) of a lymph node for lymphadenitis    Other medicines or procedures to treat the cause of the enlarged nodes  You may need follow-up exam in 3 to 4 weeks to recheck enlarged nodes.     When to call your health care provider  Call your health care provider if you have lymph nodes that are still swollen after 3 to 4 weeks.        0945-1460 [employer]. [address] [place] [address]. All rights reserved. This information is not intended as a substitute for professional medical care. Always follow your healthcare professional's instructions.        Discharge Instructions  Dental Pain    You have been seen today for a toothache. Your pain may  be caused by an exposed nerve, an infection (pulpitis), a root abscess, or other problems. You will need to see a dentist for a solution to your tooth problem. Emergency Department care is only to help control your problem until you can see a dentist.  Today, we did not find any sign that your toothache was caused by a serious condition, but sometimes symptoms develop over time and cannot be found during an emergency visit, so it is very important that you follow up with your dentist.      Return to the Emergency Department if:    You develop a fever over 101 degrees Fahrenheit.    You can t open your mouth normally, can t move your tongue well, or can t swallow.    You have new or increased swelling of your face or neck.    You develop drainage of pus or foul smelling material from around your tooth.  What can I do to help myself?    Take any antibiotic the doctor may have prescribed for you today.    Avoid very hot or very cold foods as both can cause pain.    Make an appointment to see a dentist as soon as possible. If you wish, we can provide you with a list of low-cost dental clinics.   If you were given a prescription for medicine here today, be sure to read all of the information (including the package insert) that comes with your prescription.  This will include important information about the medicine, its side effects, and any warnings that you need to know about.  The pharmacist who fills the prescription can provide more information and answer questions you may have about the medicine.  If you have questions or concerns that the pharmacist cannot address, please call or return to the Emergency Department.   Opioid Medication Information    Pain medications are among the most commonly prescribed medicines, so we are including this information for all our patients. If you did not receive pain medication or get a prescription for pain medicine, you can ignore it.     You may have been given a prescription  for an opioid (narcotic) pain medicine and/or have received a pain medicine while here in the Emergency Department. These medicines can make you drowsy or impaired. You must not drive, operate dangerous equipment, or engage in any other dangerous activities while taking these medications. If you drive while taking these medications, you could be arrested for DUI, or driving under the influence. Do not drink any alcohol while you are taking these medications.     Opioid pain medications can cause addiction. If you have a history of chemical dependency of any type, you are at a higher risk of becoming addicted to pain medications.  Only take these prescribed medications to treat your pain when all other options have been tried. Take it for as short a time and as few doses as possible. Store your pain pills in a secure place, as they are frequently stolen and provide a dangerous opportunity for children or visitors in your house to start abusing these powerful medications. We will not replace any lost or stolen medicine.  As soon as your pain is better, you should flush all your remaining medication.     Many prescription pain medications contain Tylenol  (acetaminophen), including Vicodin , Tylenol #3 , Norco , Lortab , and Percocet .  You should not take any extra pills of Tylenol  if you are using these prescription medications or you can get very sick.  Do not ever take more than 3000 mg of acetaminophen in any 24 hour period.    All opioids tend to cause constipation. Drink plenty of water and eat foods that have a lot of fiber, such as fruits, vegetables, prune juice, apple juice and high fiber cereal.  Take a laxative if you don t move your bowels at least every other day. Miralax , Milk of Magnesia, Colace , or Senna  can be used to keep you regular.      Remember that you can always come back to the Emergency Department if you are not able to see your regular doctor in the amount of time listed above, if you  get any new symptoms, or if there is anything that worries you.

## 2017-04-24 ENCOUNTER — HOSPITAL ENCOUNTER (EMERGENCY)
Facility: CLINIC | Age: 27
Discharge: HOME | End: 2017-04-24
Attending: EMERGENCY MEDICINE | Admitting: EMERGENCY MEDICINE
Payer: COMMERCIAL

## 2017-04-24 VITALS
RESPIRATION RATE: 16 BRPM | SYSTOLIC BLOOD PRESSURE: 138 MMHG | HEART RATE: 68 BPM | TEMPERATURE: 98.9 F | OXYGEN SATURATION: 99 % | DIASTOLIC BLOOD PRESSURE: 93 MMHG

## 2017-04-24 DIAGNOSIS — R10.2 PELVIC PAIN IN FEMALE: ICD-10-CM

## 2017-04-24 LAB
ALBUMIN UR-MCNC: NEGATIVE MG/DL
APPEARANCE UR: CLEAR
BILIRUB UR QL STRIP: NEGATIVE
COLOR UR AUTO: YELLOW
GLUCOSE UR STRIP-MCNC: NEGATIVE MG/DL
HCG UR QL: NEGATIVE
HGB UR QL STRIP: NEGATIVE
KETONES UR STRIP-MCNC: NEGATIVE MG/DL
LEUKOCYTE ESTERASE UR QL STRIP: ABNORMAL
MICRO REPORT STATUS: NORMAL
MUCOUS THREADS #/AREA URNS LPF: PRESENT /LPF
NITRATE UR QL: NEGATIVE
PH UR STRIP: 7 PH (ref 5–7)
RBC #/AREA URNS AUTO: <1 /HPF (ref 0–2)
SP GR UR STRIP: 1.02 (ref 1–1.03)
SPECIMEN SOURCE: NORMAL
SQUAMOUS #/AREA URNS AUTO: 1 /HPF (ref 0–1)
URN SPEC COLLECT METH UR: ABNORMAL
UROBILINOGEN UR STRIP-MCNC: 0 MG/DL (ref 0–2)
WBC #/AREA URNS AUTO: 2 /HPF (ref 0–2)
WET PREP SPEC: NORMAL

## 2017-04-24 PROCEDURE — 81001 URINALYSIS AUTO W/SCOPE: CPT | Performed by: EMERGENCY MEDICINE

## 2017-04-24 PROCEDURE — 81025 URINE PREGNANCY TEST: CPT | Performed by: EMERGENCY MEDICINE

## 2017-04-24 PROCEDURE — 87491 CHLMYD TRACH DNA AMP PROBE: CPT | Performed by: EMERGENCY MEDICINE

## 2017-04-24 PROCEDURE — 87210 SMEAR WET MOUNT SALINE/INK: CPT | Performed by: EMERGENCY MEDICINE

## 2017-04-24 PROCEDURE — 99283 EMERGENCY DEPT VISIT LOW MDM: CPT

## 2017-04-24 PROCEDURE — 25000132 ZZH RX MED GY IP 250 OP 250 PS 637: Performed by: EMERGENCY MEDICINE

## 2017-04-24 PROCEDURE — 87591 N.GONORRHOEAE DNA AMP PROB: CPT | Performed by: EMERGENCY MEDICINE

## 2017-04-24 RX ORDER — ACETAMINOPHEN 325 MG/1
650 TABLET ORAL ONCE
Status: COMPLETED | OUTPATIENT
Start: 2017-04-24 | End: 2017-04-24

## 2017-04-24 RX ADMIN — ACETAMINOPHEN 650 MG: 325 TABLET, FILM COATED ORAL at 18:07

## 2017-04-24 ASSESSMENT — ENCOUNTER SYMPTOMS
FEVER: 0
EYES NEGATIVE: 1
ABDOMINAL PAIN: 0
DYSURIA: 0
FLANK PAIN: 0
RESPIRATORY NEGATIVE: 1
HEMATURIA: 0
NEUROLOGICAL NEGATIVE: 1
NAUSEA: 0
VOMITING: 0
CHILLS: 0

## 2017-04-24 NOTE — ED PROVIDER NOTES
History     Chief Complaint:  Pelvic pain    HPI   Elissa Avila is a 27 year old female who presents with pelvic pain.  The patient states that 3 Weeks ago she developed pain with intercourse.  She notes that she was evaluated at a separate hospital following this and was diagnosed with Trichomonas.  She states she was treated for this at that time.  She states her partner was treated as well.  Despite finishing the course of antibiotics, the patient notes persisting pain with intercourse.  Therefore, this is why she decided to present to the emergency department.  She endorses occasional spotting with intercourse.  She denies fever, chills, abdominal pain, vaginal discharge, dysuria, or other complaints at this time.  She denies having any abdominal pain or any symptoms unless she is having intercourse.     Allergies:  Allergies   Allergen Reactions     No Known Drug Allergies      Contrast Dye Nausea and Vomiting     Not an allergy but patient should have an anti-nausea before scan. She vomits every time after injection.        Medications:      LITHIUM PO       Problem List:    Patient Active Problem List    Diagnosis Date Noted     Vaginal discharge 09/10/2015     Priority: Medium     Indication for care in labor or delivery 2015     Priority: Medium      labor 2015     Priority: Medium     ASCUS with positive high risk HPV 2015     05/06/15 ASCUS pap, +HPV HR, pt. Is pregnant, sent to DR. Lyles for further directive, HM updated, episode created, tracking has been started.  05/19/15 Pt. Is scheduled for a pregnancy colp scan on 6-02-15 with DR. Lyles.  06/02/15 [place] scan done, pt. Is to schedule a postpartum colposcopy, EDC 10-17-15  11/11/15 Nurse Sutton spoke with pt., pt.delivered  on 10-19-15 in [place], pt.informed Lesley that she Will having her postpartum appt. And colposcopy done with an MD in [place]. Pt. May be coming back to [place] at some time, but not for  now.       Encounter for triage in pregnant patient 05/12/2015     Abdominal pain 04/29/2015     Maternal gonorrhea complicating pregnancy, childbirth, or the puerperium, unspecified as to episode of care 11/20/2007     Cannabis abuse, continuous 11/02/2007     In current pregnancy       Encounter for supervision of other normal pregnancy 10/24/2007     Diagnosis updated by automated process. Provider to review and confirm.       Streptococcus infection in conditions classified elsewhere and of unspecified site, group B 02/02/2004        Past Medical History:    Past Medical History:   Diagnosis Date     Anxiety      ASCUS (atypical squamous cells of undetermined significance) on gynecologic Papanicolaou smear complicating pregnancy, antepartum 01/13/14     ASCUS with positive high risk HPV 05/06/15     Depression 2015     Depressive disorder      Papanicolaou smear of cervix with low grade squamous intraepithelial lesion (LGSIL) (aka LSIL) 06/03/04       Past Surgical History:    Past Surgical History:   Procedure Laterality Date     NO HISTORY OF SURGERY         Family History:    Family History   Problem Relation Age of Onset     Family History Negative       C.A.D. Maternal Grandmother      CHF     Hypertension Maternal Grandmother      Hypertension Maternal Grandfather      C.A.D. Maternal Grandfather      enlarged heart     Hypertension Paternal Grandmother        Social History:  Marital Status:  Single [1]  Social History   Substance Use Topics     Smoking status: Current Some Day Smoker     Packs/day: 0.50     Types: Cigarettes     Smokeless tobacco: Not on file     Alcohol use No        Review of Systems   Constitutional: Negative for chills and fever.   HENT: Negative.    Eyes: Negative.    Respiratory: Negative.    Gastrointestinal: Negative for abdominal pain, nausea and vomiting.   Genitourinary: Positive for pelvic pain and vaginal bleeding (spotting with intercourse). Negative for dysuria, flank  pain, genital sores, hematuria and vaginal discharge.   Skin: Negative.    Neurological: Negative.        Physical Exam   First Vitals:  BP: (!) 138/93  Pulse: 75  Temp: 98.9  F (37.2  C)  Resp: 16  SpO2: 100 %    Physical Exam  Constitutional: The patient is oriented to person, place, and time. Alert and cooperative.  HENT:   Right Ear: External ear normal.   Left Ear: External ear normal.   Nose: Nose normal.   Mouth/Throat: Moist mucous membranes.   Eyes: Conjunctivae, EOM and lids are normal.   Neck: Trachea normal. Normal range of motion. Neck supple.   Cardiovascular: Normal rate, regular rhythm, normal heart sounds, and intact distal pulses.    Pulmonary/Chest: Effort normal and breath sounds equal bilaterally. No crackles or wheezing.   Abdominal: Soft. No tenderness. No rebound and no guarding.   Musculoskeletal: Normal range of motion.  No extremity tenderness or edema.  Neurological: Alert and Oriented. Moves all extremities equally.   Skin: Skin is dry. No rash noted.    : normal appearing external genitalia. No blood or discharge in the vaginal vault. No cervical motion tenderness. No adnexal tenderness.       Impression & Plan      Medical Decision Making:  Elissa Avila is a 27 year old female who presents with pelvic pain.  Upon presentation in the ED, the patient is nontoxic appearing.  She is mildly hypertensive, but vitals are otherwise within normal limits and stable.  On exam, she is well-appearing.  She is alert with a nonfocal neurologic exam.  Cardiopulmonary exam is unremarkable.  Abdomen is soft and nontender throughout.  On  examination, there is no evidence of blood or discharge in the vaginal vault.  She has no cervical motion tenderness.  She has no adnexal tenderness.  There is some exams as mentioned above.    Urinalysis was obtained and demonstrates no evidence of an infectious process.  UPT is negative.  Wet prep is negative for Trichomonas, yeast, or clue cells.  The  results were discussed with the patient she notes understanding.  Gonorrhea and chlamydia PCR's are in process.  Per review of records, the patient had gonorrhea and chlamydia testing nearly one month ago that were negative.  She has no signs on exam to suggest pelvic inflammatory disease. She has no low abdominal/pelvic tenderness to suggest acute adnexal or uterine pathology or to warrant ultrasound of the pelvis at this time.  She is no peritoneal signs on abdominal examination to suggest an acute surgical abdomen or warrant imaging of her abdomen at this time.  Overall, given that the patient is well-appearing and with an unremarkable workup here, I do feel that she can be discharged home.  I did discuss with the patient that I recommend close follow-up with her primary care physician and ObGyn and she notes understanding and agreement.  Return instructions given.  She was stable/improved at the time of discharge.    Diagnosis:    ICD-10-CM    1. Pelvic pain in female R10.2        Disposition:  discharged to home      Steph Myers  4/24/2017   [facility] EMERGENCY DEPARTMENT       Steph Myers MD  04/25/17 0252

## 2017-04-24 NOTE — ED AVS SNAPSHOT
[facility] Emergency Department    [address]    [facility] [address]    Phone:  [phone]    Fax:  [phone]                                       Elissa Avila   MRN: [id number]    Department:  [facility] Emergency Department   Date of Visit:  4/24/2017           After Visit Summary Signature Page     I have received my discharge instructions, and my questions have been answered. I have discussed any challenges I see with this plan with the nurse or doctor.    ..........................................................................................................................................  Patient/Patient Representative Signature      ..........................................................................................................................................  Patient Representative Print Name and Relationship to Patient    ..................................................               ................................................  Date                                            Time    ..........................................................................................................................................  Reviewed by Signature/Title    ...................................................              ..............................................  Date                                                            Time

## 2017-04-24 NOTE — ED AVS SNAPSHOT
[facility] Emergency Department    [address] [facility] [address]    Phone:  [phone]    Fax:  [phone]                                       Elissa Avila   MRN: [id number]    Department:  [facility] Emergency Department   Date of Visit:  4/24/2017           Patient Information     Date Of Birth          1990        Your diagnoses for this visit were:     Pelvic pain in female        You were seen by Steph Myers MD.      Follow-up Information     Follow up with Mario Cannon MD In 3 days.    Specialty:  OB/Gyn    Contact information:    [facility]  [address]  [facility] [address]  [phone]          Follow up with [facility] In 3 days.    Specialties:  Sports Medicine, Pain Management, Obstetrics & Gynecology, Pediatrics, Internal Medicine, Nephrology    Contact information:    [address]  [facility] [address] [phone]        Discharge Instructions       Please follow up closely with your regular physician and the Ob. Please return to the ED if your symptoms worsen or if you develop new or concerning symptoms.         24 Hour Appointment Hotline       To make an appointment at any [facility], call [phone] ([phone]). If you don't have a family doctor or clinic, we will help you find one. [facility] are conveniently located to serve the needs of you and your family.             Review of your medicines      Our records show that you are taking the medicines listed below. If these are incorrect, please call your family doctor or clinic.        Dose / Directions Last dose taken    LITHIUM PO        Refills:  0                Procedures and tests performed during your visit     Chlamydia trachomatis PCR    HCG qualitative urine    Neisseria gonorrhoeae PCR    UA with Microscopic    Wet prep      Orders Needing Specimen Collection      None      Pending Results     Date and Time Order Name Status Description    4/24/2017 1802 Chlamydia trachomatis PCR In process     4/24/2017 1802 Neisseria gonorrhoeae PCR In process             Pending Culture Results     Date and Time Order Name Status Description    4/24/2017 1802 Chlamydia trachomatis PCR In process     4/24/2017 1802 Neisseria gonorrhoeae PCR In process             Test Results From Your Hospital Stay        4/24/2017  6:01 PM      Component Results     Component Value Ref Range & Units Status    Color Urine Yellow  Final    Appearance Urine Clear  Final    Glucose Urine Negative NEG mg/dL Final    Bilirubin Urine Negative NEG Final    Ketones Urine Negative NEG mg/dL Final    Specific Gravity Urine 1.017 1.003 - 1.035 Final    Blood Urine Negative NEG Final    pH Urine 7.0 5.0 - 7.0 pH Final    Protein Albumin Urine Negative NEG mg/dL Final    Urobilinogen mg/dL 0.0 0.0 - 2.0 mg/dL Final    Nitrite Urine Negative NEG Final    Leukocyte Esterase Urine Trace (A) NEG Final    Source Midstream Urine  Final    WBC Urine 2 0 - 2 /HPF Final    RBC Urine <1 0 - 2 /HPF Final    Squamous Epithelial /HPF Urine 1 0 - 1 /HPF Final    Mucous Urine Present (A) NEG /LPF Final         4/24/2017  5:59 PM      Component Results     Component Value Ref Range & Units Status    HCG Qual Urine Negative NEG Final         4/24/2017  6:47 PM      Component Results     Component    Specimen Description    Vagina    Wet Prep    No WBC'S seen  No Trichomonas seen  No yeast seen  No clue cells seen      Micro Report Status    FINAL 04/24/2017 4/24/2017  6:35 PM         4/24/2017  6:35 PM                Clinical Quality Measure: Blood Pressure Screening     Your blood pressure was checked while you were in the emergency department today. The last reading we obtained was  BP: (!) 138/93 . Please read the guidelines below about what these numbers mean and what you should do about them.  If your systolic blood  "pressure (the top number) is less than 120 and your diastolic blood pressure (the bottom number) is less than 80, then your blood pressure is normal. There is nothing more that you need to do about it.  If your systolic blood pressure (the top number) is 120-139 or your diastolic blood pressure (the bottom number) is 80-89, your blood pressure may be higher than it should be. You should have your blood pressure rechecked within a year by a primary care provider.  If your systolic blood pressure (the top number) is 140 or greater or your diastolic blood pressure (the bottom number) is 90 or greater, you may have high blood pressure. High blood pressure is treatable, but if left untreated over time it can put you at risk for heart attack, stroke, or kidney failure. You should have your blood pressure rechecked by a primary care provider within the next 4 weeks.  If your provider in the emergency department today gave you specific instructions to follow-up with your doctor or provider even sooner than that, you should follow that instruction and not wait for up to 4 weeks for your follow-up visit.        Thank you for choosing [facility]       Thank you for choosing [facility] for your care. Our goal is always to provide you with excellent care. Hearing back from our patients is one way we can continue to improve our services. Please take a few minutes to complete the written survey that you may receive in the mail after you visit with us. Thank you!        [email or web address] Information     [employer] lets you send messages to your doctor, view your test results, renew your prescriptions, schedule appointments and more. To sign up, go to [email or web address] . Click on \"Log in\" on the left side of the screen, which will take you to the Welcome page. Then click on \"Sign up Now\" on the right side of the page.     You will be asked to enter the access code listed below, as well as some personal information. Please follow the " directions to create your username and password.     Your access code is: [email or web address]  Expires: 2017  4:47 PM     Your access code will  in 90 days. If you need help or a new code, please call your [place] clinic or [phone].        Care EveryWhere ID     This is your Care EveryWhere ID. This could be used by other organizations to access your [facility] medical records  [id number]        After Visit Summary       This is your record. Keep this with you and show to your community pharmacist(s) and doctor(s) at your next visit.

## 2017-04-24 NOTE — ED NOTES
Patient presents to the ED with lower abdominal pain.  has been having pain for the past 3 weeks.  was treated for trichomonas but feels like symptoms did not resolve. Also reports pain with intercourse.

## 2017-04-25 NOTE — DISCHARGE INSTRUCTIONS
Please follow up closely with your regular physician and the Ob. Please return to the ED if your symptoms worsen or if you develop new or concerning symptoms.

## 2017-05-04 ENCOUNTER — HOSPITAL ENCOUNTER (EMERGENCY)
Facility: CLINIC | Age: 27
Discharge: HOME | End: 2017-05-04
Attending: EMERGENCY MEDICINE | Admitting: EMERGENCY MEDICINE
Payer: COMMERCIAL

## 2017-05-04 ENCOUNTER — APPOINTMENT (OUTPATIENT)
Dept: CT IMAGING | Facility: CLINIC | Age: 27
End: 2017-05-04
Attending: EMERGENCY MEDICINE
Payer: COMMERCIAL

## 2017-05-04 VITALS
HEART RATE: 62 BPM | DIASTOLIC BLOOD PRESSURE: 56 MMHG | OXYGEN SATURATION: 99 % | SYSTOLIC BLOOD PRESSURE: 109 MMHG | TEMPERATURE: 98.3 F | RESPIRATION RATE: 18 BRPM

## 2017-05-04 DIAGNOSIS — K12.2 SUBMANDIBULAR ABSCESS: ICD-10-CM

## 2017-05-04 DIAGNOSIS — L03.211 FACIAL CELLULITIS: ICD-10-CM

## 2017-05-04 LAB
ANION GAP SERPL CALCULATED.3IONS-SCNC: 5 MMOL/L (ref 3–14)
BASOPHILS # BLD AUTO: 0 10E9/L (ref 0–0.2)
BASOPHILS NFR BLD AUTO: 0.2 %
BUN SERPL-MCNC: 15 MG/DL (ref 7–30)
CALCIUM SERPL-MCNC: 8.9 MG/DL (ref 8.5–10.1)
CHLORIDE SERPL-SCNC: 106 MMOL/L (ref 94–109)
CO2 SERPL-SCNC: 27 MMOL/L (ref 20–32)
CREAT SERPL-MCNC: 0.75 MG/DL (ref 0.52–1.04)
DIFFERENTIAL METHOD BLD: NORMAL
EOSINOPHIL # BLD AUTO: 0.2 10E9/L (ref 0–0.7)
EOSINOPHIL NFR BLD AUTO: 2.3 %
ERYTHROCYTE [DISTWIDTH] IN BLOOD BY AUTOMATED COUNT: 14.6 % (ref 10–15)
GFR SERPL CREATININE-BSD FRML MDRD: NORMAL ML/MIN/1.7M2
GLUCOSE SERPL-MCNC: 89 MG/DL (ref 70–99)
HCT VFR BLD AUTO: 39.6 % (ref 35–47)
HGB BLD-MCNC: 12.5 G/DL (ref 11.7–15.7)
IMM GRANULOCYTES # BLD: 0 10E9/L (ref 0–0.4)
IMM GRANULOCYTES NFR BLD: 0.2 %
LYMPHOCYTES # BLD AUTO: 3 10E9/L (ref 0.8–5.3)
LYMPHOCYTES NFR BLD AUTO: 45.6 %
MCH RBC QN AUTO: 27.3 PG (ref 26.5–33)
MCHC RBC AUTO-ENTMCNC: 31.6 G/DL (ref 31.5–36.5)
MCV RBC AUTO: 87 FL (ref 78–100)
MONOCYTES # BLD AUTO: 0.4 10E9/L (ref 0–1.3)
MONOCYTES NFR BLD AUTO: 6 %
NEUTROPHILS # BLD AUTO: 3.1 10E9/L (ref 1.6–8.3)
NEUTROPHILS NFR BLD AUTO: 45.7 %
NRBC # BLD AUTO: 0 10*3/UL
NRBC BLD AUTO-RTO: 0 /100
PLATELET # BLD AUTO: 252 10E9/L (ref 150–450)
POTASSIUM SERPL-SCNC: 3.8 MMOL/L (ref 3.4–5.3)
RBC # BLD AUTO: 4.58 10E12/L (ref 3.8–5.2)
SODIUM SERPL-SCNC: 138 MMOL/L (ref 133–144)
WBC # BLD AUTO: 6.7 10E9/L (ref 4–11)

## 2017-05-04 PROCEDURE — 96375 TX/PRO/DX INJ NEW DRUG ADDON: CPT

## 2017-05-04 PROCEDURE — 96361 HYDRATE IV INFUSION ADD-ON: CPT

## 2017-05-04 PROCEDURE — 25000125 ZZHC RX 250: Performed by: EMERGENCY MEDICINE

## 2017-05-04 PROCEDURE — 25000128 H RX IP 250 OP 636: Performed by: EMERGENCY MEDICINE

## 2017-05-04 PROCEDURE — 85025 COMPLETE CBC W/AUTO DIFF WBC: CPT | Performed by: EMERGENCY MEDICINE

## 2017-05-04 PROCEDURE — 25500064 ZZH RX 255 OP 636: Performed by: EMERGENCY MEDICINE

## 2017-05-04 PROCEDURE — S0077 INJECTION, CLINDAMYCIN PHOSP: HCPCS | Performed by: EMERGENCY MEDICINE

## 2017-05-04 PROCEDURE — 99285 EMERGENCY DEPT VISIT HI MDM: CPT | Mod: 25

## 2017-05-04 PROCEDURE — 96365 THER/PROPH/DIAG IV INF INIT: CPT

## 2017-05-04 PROCEDURE — 80048 BASIC METABOLIC PNL TOTAL CA: CPT | Performed by: EMERGENCY MEDICINE

## 2017-05-04 PROCEDURE — 25000132 ZZH RX MED GY IP 250 OP 250 PS 637: Performed by: EMERGENCY MEDICINE

## 2017-05-04 PROCEDURE — 25000128 H RX IP 250 OP 636

## 2017-05-04 PROCEDURE — 70491 CT SOFT TISSUE NECK W/DYE: CPT

## 2017-05-04 RX ORDER — LIDOCAINE 40 MG/G
CREAM TOPICAL
Status: DISCONTINUED | OUTPATIENT
Start: 2017-05-04 | End: 2017-05-04 | Stop reason: HOSPADM

## 2017-05-04 RX ORDER — IBUPROFEN 600 MG/1
600 TABLET, FILM COATED ORAL ONCE
Status: COMPLETED | OUTPATIENT
Start: 2017-05-04 | End: 2017-05-04

## 2017-05-04 RX ORDER — IOPAMIDOL 755 MG/ML
500 INJECTION, SOLUTION INTRAVASCULAR ONCE
Status: COMPLETED | OUTPATIENT
Start: 2017-05-04 | End: 2017-05-04

## 2017-05-04 RX ORDER — METOCLOPRAMIDE HYDROCHLORIDE 5 MG/ML
10 INJECTION INTRAMUSCULAR; INTRAVENOUS ONCE
Status: COMPLETED | OUTPATIENT
Start: 2017-05-04 | End: 2017-05-04

## 2017-05-04 RX ORDER — HYDROMORPHONE HYDROCHLORIDE 1 MG/ML
INJECTION, SOLUTION INTRAMUSCULAR; INTRAVENOUS; SUBCUTANEOUS
Status: COMPLETED
Start: 2017-05-04 | End: 2017-05-04

## 2017-05-04 RX ORDER — CLINDAMYCIN PHOSPHATE 900 MG/50ML
900 INJECTION, SOLUTION INTRAVENOUS ONCE
Status: COMPLETED | OUTPATIENT
Start: 2017-05-04 | End: 2017-05-04

## 2017-05-04 RX ORDER — DIPHENHYDRAMINE HYDROCHLORIDE 50 MG/ML
25 INJECTION INTRAMUSCULAR; INTRAVENOUS ONCE
Status: COMPLETED | OUTPATIENT
Start: 2017-05-04 | End: 2017-05-04

## 2017-05-04 RX ORDER — HYDROCODONE BITARTRATE AND ACETAMINOPHEN 5; 325 MG/1; MG/1
1-2 TABLET ORAL EVERY 4 HOURS PRN
Qty: 15 TABLET | Refills: 0 | Status: SHIPPED | OUTPATIENT
Start: 2017-05-04

## 2017-05-04 RX ORDER — CLINDAMYCIN HCL 300 MG
300 CAPSULE ORAL 4 TIMES DAILY
Qty: 40 CAPSULE | Refills: 0 | Status: SHIPPED | OUTPATIENT
Start: 2017-05-04 | End: 2017-05-14

## 2017-05-04 RX ORDER — CLINDAMYCIN HCL 150 MG
300 CAPSULE ORAL ONCE
Status: DISCONTINUED | OUTPATIENT
Start: 2017-05-04 | End: 2017-05-04 | Stop reason: HOSPADM

## 2017-05-04 RX ADMIN — SODIUM CHLORIDE 1000 ML: 9 INJECTION, SOLUTION INTRAVENOUS at 04:25

## 2017-05-04 RX ADMIN — DIPHENHYDRAMINE HYDROCHLORIDE 25 MG: 50 INJECTION, SOLUTION INTRAMUSCULAR; INTRAVENOUS at 04:37

## 2017-05-04 RX ADMIN — IOPAMIDOL 65 ML: 755 INJECTION, SOLUTION INTRAVENOUS at 04:52

## 2017-05-04 RX ADMIN — IBUPROFEN 600 MG: 600 TABLET ORAL at 03:42

## 2017-05-04 RX ADMIN — CLINDAMYCIN PHOSPHATE 900 MG: 18 INJECTION, SOLUTION INTRAVENOUS at 05:17

## 2017-05-04 RX ADMIN — METOCLOPRAMIDE 10 MG: 5 INJECTION, SOLUTION INTRAMUSCULAR; INTRAVENOUS at 04:37

## 2017-05-04 RX ADMIN — HYDROMORPHONE HYDROCHLORIDE 0.5 MG: 1 INJECTION, SOLUTION INTRAMUSCULAR; INTRAVENOUS; SUBCUTANEOUS at 06:06

## 2017-05-04 NOTE — ED AVS SNAPSHOT
" [facility] Emergency Department    [address]    [facility] [address]    Phone:  [phone]    Fax:  [phone]                                       Elissa Avila   MRN: [id number]    Department:  [facility] Emergency Department   Date of Visit:  5/4/2017           Patient Information     Date Of Birth          1990        Your diagnoses for this visit were:     Submandibular abscess     Facial cellulitis        You were seen by Holden Menon MD.        Discharge Instructions       Go to the Oral Surgery Clinic at the [facility] this morning at 7:45. Tell the  that you are to be \"worked in\" to the clinic this morning.        Dental Abscess with Facial Cellulitis    A dental abscess is an infection at the base of a tooth. It means a pocket of pus has formed at the tip of a tooth root in your jaw bone. If the infection isn t treated, it can spread to the gum near the tooth. This causes swelling and pain. More serious infections spread to the face. This causes your face to swell (cellulitis). This is a very serious condition. Once the swelling begins, it can spread quickly.  A dental abscess usually starts with a crack or cavity in a tooth. The pain is often made worse by drinking hot or cold beverages, or biting on hard foods. The pain may spread from the tooth to your ear or the area of your jaw on the same side.  Home care  Follow these tips when caring for yourself at home:    Avoid hot and cold foods and drinks. Your tooth may be sensitive to changes in temperature. Don t chew on the side of the infected tooth.    If your tooth is chipped or cracked, or if there is a large open cavity, put oil of cloves directly on the tooth to relieve pain. You can buy oil of cloves at drugstores. Some pharmacies carry an over-the-counter \"toothache kit.\" This contains a paste that you can put on the exposed tooth to make it less " "sensitive.    Put a cold pack on your jaw over the sore area to help reduce pain.    You may use acetaminophen or ibuprofen to ease pain, unless another medicine was prescribed. Note: If you have chronic liver or kidney disease, talk with your health care provider before using these medicines. Also talk with your provider if you ve had a stomach ulcer or GI bleeding.     An antibiotic will be prescribed. Take it exactly as directed. Don t miss any doses.  Follow-up care  Follow up with your dentist or an oral surgeon as advised. Severe cases of cellulitis must be checked again within 24 hours. Once an infection occurs in a tooth, it will continue to be a problem until the infection is drained. This is done through surgery or a root canal. Or you may need to have your tooth pulled.  When to seek medical advice  Call your health care provider right away if any of these occur:    Swelling spreads to the upper half of your face or neck    You eyelids begin to swell shut    Pain gets worse or spreads to your neck    Fever of 100.4 F (38 C) or higher, or as directed by your health care provider    Unusual drowsiness    Headache or a stiff neck    Weakness or fainting    Difficulty swallowing or breathing       6311-7050 [employer]. [address] [place] [address]. All rights reserved. This information is not intended as a substitute for professional medical care. Always follow your healthcare professional's instructions.      The clinic \"opens\" at 8AM and they will work you in to be seen this morning.    [facility] 7th floor ([facility] [place])  [address] [place] [address]    24 Hour Appointment Hotline       To make an appointment at any [facility], call [phone] ([phone]). If you don't have a family doctor or clinic, we will help you find one. [facility] clinics are conveniently located to serve the needs of you and your family.        "      Review of your medicines      START taking        Dose / Directions Last dose taken    clindamycin 300 MG capsule   Commonly known as:  CLEOCIN   Dose:  300 mg   Quantity:  40 capsule        Take 1 capsule (300 mg) by mouth 4 times daily for 10 days   Refills:  0        HYDROcodone-acetaminophen 5-325 MG per tablet   Commonly known as:  NORCO   Dose:  1-2 tablet   Quantity:  15 tablet        Take 1-2 tablets by mouth every 4 hours as needed for moderate to severe pain   Refills:  0          Our records show that you are taking the medicines listed below. If these are incorrect, please call your family doctor or clinic.        Dose / Directions Last dose taken    LITHIUM PO        Refills:  0                Prescriptions were sent or printed at these locations (2 Prescriptions)                   Other Prescriptions                Printed at Department/Unit printer (2 of 2)         clindamycin (CLEOCIN) 300 MG capsule               HYDROcodone-acetaminophen (NORCO) 5-325 MG per tablet                Procedures and tests performed during your visit     Basic metabolic panel    CBC with platelets differential    Peripheral IV catheter    Soft tissue neck CT w contrast      Orders Needing Specimen Collection     None      Pending Results     Date and Time Order Name Status Description    5/4/2017 0405 Soft tissue neck CT w contrast In process             Pending Culture Results     No orders found from 5/2/2017 to 5/5/2017.            Pending Results Instructions     If you had any lab results that were not finalized at the time of your Discharge, you can call the ED Lab Result RN at [phone]. You will be contacted by this team for any positive Lab results or changes in treatment. The nurses are available 7 days a week from 10A to 6:30P.  You can leave a message 24 hours per day and they will return your call.        Test Results From Your Hospital Stay        5/4/2017  4:29 AM      Component Results      Component Value Ref Range & Units Status    WBC 6.7 4.0 - 11.0 10e9/L Final    RBC Count 4.58 3.8 - 5.2 10e12/L Final    Hemoglobin 12.5 11.7 - 15.7 g/dL Final    Hematocrit 39.6 35.0 - 47.0 % Final    MCV 87 78 - 100 fl Final    MCH 27.3 26.5 - 33.0 pg Final    MCHC 31.6 31.5 - 36.5 g/dL Final    RDW 14.6 10.0 - 15.0 % Final    Platelet Count 252 150 - 450 10e9/L Final    Diff Method Automated Method  Final    % Neutrophils 45.7 % Final    % Lymphocytes 45.6 % Final    % Monocytes 6.0 % Final    % Eosinophils 2.3 % Final    % Basophils 0.2 % Final    % Immature Granulocytes 0.2 % Final    Nucleated RBCs 0 0 /100 Final    Absolute Neutrophil 3.1 1.6 - 8.3 10e9/L Final    Absolute Lymphocytes 3.0 0.8 - 5.3 10e9/L Final    Absolute Monocytes 0.4 0.0 - 1.3 10e9/L Final    Absolute Eosinophils 0.2 0.0 - 0.7 10e9/L Final    Absolute Basophils 0.0 0.0 - 0.2 10e9/L Final    Abs Immature Granulocytes 0.0 0 - 0.4 10e9/L Final    Absolute Nucleated RBC 0.0  Final         5/4/2017  4:50 AM      Component Results     Component Value Ref Range & Units Status    Sodium 138 133 - 144 mmol/L Final    Potassium 3.8 3.4 - 5.3 mmol/L Final    Chloride 106 94 - 109 mmol/L Final    Carbon Dioxide 27 20 - 32 mmol/L Final    Anion Gap 5 3 - 14 mmol/L Final    Glucose 89 70 - 99 mg/dL Final    Urea Nitrogen 15 7 - 30 mg/dL Final    Creatinine 0.75 0.52 - 1.04 mg/dL Final    GFR Estimate >90  Non  GFR Calc   >60 mL/min/1.7m2 Final    GFR Estimate If Black >90   GFR Calc   >60 mL/min/1.7m2 Final    Calcium 8.9 8.5 - 10.1 mg/dL Final         5/4/2017  4:47 AM      Result not yet available     Exam Ended                Clinical Quality Measure: Blood Pressure Screening     Your blood pressure was checked while you were in the emergency department today. The last reading we obtained was  BP: 109/56 . Please read the guidelines below about what these numbers mean and what you should do about them.  If your  "systolic blood pressure (the top number) is less than 120 and your diastolic blood pressure (the bottom number) is less than 80, then your blood pressure is normal. There is nothing more that you need to do about it.  If your systolic blood pressure (the top number) is 120-139 or your diastolic blood pressure (the bottom number) is 80-89, your blood pressure may be higher than it should be. You should have your blood pressure rechecked within a year by a primary care provider.  If your systolic blood pressure (the top number) is 140 or greater or your diastolic blood pressure (the bottom number) is 90 or greater, you may have high blood pressure. High blood pressure is treatable, but if left untreated over time it can put you at risk for heart attack, stroke, or kidney failure. You should have your blood pressure rechecked by a primary care provider within the next 4 weeks.  If your provider in the emergency department today gave you specific instructions to follow-up with your doctor or provider even sooner than that, you should follow that instruction and not wait for up to 4 weeks for your follow-up visit.        Thank you for choosing [facility]       Thank you for choosing [facility] for your care. Our goal is always to provide you with excellent care. Hearing back from our patients is one way we can continue to improve our services. Please take a few minutes to complete the written survey that you may receive in the mail after you visit with us. Thank you!        [employer] Information     [employer] lets you send messages to your doctor, view your test results, renew your prescriptions, schedule appointments and more. To sign up, go to [email or web address] . Click on \"Log in\" on the left side of the screen, which will take you to the Welcome page. Then click on \"Sign up Now\" on the right side of the page.     You will be asked to enter the access code listed below, as well as some personal information. Please " follow the directions to create your username and password.     Your access code is: [email or web address]  Expires: 2017  4:47 PM     Your access code will  in 90 days. If you need help or a new code, please call your [facility] clinic or [phone].        Care EveryWhere ID     This is your Care EveryWhere ID. This could be used by other organizations to access your [facility] medical records  [id number]        After Visit Summary       This is your record. Keep this with you and show to your community pharmacist(s) and doctor(s) at your next visit.

## 2017-05-04 NOTE — DISCHARGE INSTRUCTIONS
"Go to the Oral Surgery Clinic at the [facility] this morning at 7:45. Tell the  that you are to be \"worked in\" to the clinic this morning.        Dental Abscess with Facial Cellulitis    A dental abscess is an infection at the base of a tooth. It means a pocket of pus has formed at the tip of a tooth root in your jaw bone. If the infection isn t treated, it can spread to the gum near the tooth. This causes swelling and pain. More serious infections spread to the face. This causes your face to swell (cellulitis). This is a very serious condition. Once the swelling begins, it can spread quickly.  A dental abscess usually starts with a crack or cavity in a tooth. The pain is often made worse by drinking hot or cold beverages, or biting on hard foods. The pain may spread from the tooth to your ear or the area of your jaw on the same side.  Home care  Follow these tips when caring for yourself at home:    Avoid hot and cold foods and drinks. Your tooth may be sensitive to changes in temperature. Don t chew on the side of the infected tooth.    If your tooth is chipped or cracked, or if there is a large open cavity, put oil of cloves directly on the tooth to relieve pain. You can buy oil of cloves at drugstores. Some pharmacies carry an over-the-counter \"toothache kit.\" This contains a paste that you can put on the exposed tooth to make it less sensitive.    Put a cold pack on your jaw over the sore area to help reduce pain.    You may use acetaminophen or ibuprofen to ease pain, unless another medicine was prescribed. Note: If you have chronic liver or kidney disease, talk with your health care provider before using these medicines. Also talk with your provider if you ve had a stomach ulcer or GI bleeding.     An antibiotic will be prescribed. Take it exactly as directed. Don t miss any doses.  Follow-up care  Follow up with your dentist or an oral surgeon as advised. Severe cases of cellulitis " "must be checked again within 24 hours. Once an infection occurs in a tooth, it will continue to be a problem until the infection is drained. This is done through surgery or a root canal. Or you may need to have your tooth pulled.  When to seek medical advice  Call your health care provider right away if any of these occur:    Swelling spreads to the upper half of your face or neck    You eyelids begin to swell shut    Pain gets worse or spreads to your neck    Fever of 100.4 F (38 C) or higher, or as directed by your health care provider    Unusual drowsiness    Headache or a stiff neck    Weakness or fainting    Difficulty swallowing or breathing       2447-9440 [employer]. [address] [place] [address], [place] [address]. All rights reserved. This information is not intended as a substitute for professional medical care. Always follow your healthcare professional's instructions.      The clinic \"opens\" at 8AM and they will work you in to be seen this morning.    [facility] 7th floor ([facility] [place])  [address] [place] [address]  "

## 2017-05-04 NOTE — ED AVS SNAPSHOT
[facility] Emergency Department    [address]    [facility] [address]    Phone:  [phone]    Fax:  [phone]                                       Elissa Avila   MRN: [id number]    Department:  [facility] Emergency Department   Date of Visit:  5/4/2017           After Visit Summary Signature Page     I have received my discharge instructions, and my questions have been answered. I have discussed any challenges I see with this plan with the nurse or doctor.    ..........................................................................................................................................  Patient/Patient Representative Signature      ..........................................................................................................................................  Patient Representative Print Name and Relationship to Patient    ..................................................               ................................................  Date                                            Time    ..........................................................................................................................................  Reviewed by Signature/Title    ...................................................              ..............................................  Date                                                            Time

## 2017-05-04 NOTE — ED PROVIDER NOTES
History     Chief Complaint:    Dental pain Facial swelling    HPI   Elissa Avila is a 27 year old female who presents with dental pain and facial swelling. The patient states that she had a right lower molar extracted yesterday. She was prescribed amoxicillin for this as well as Vicodin; she started the amoxicillin prior to the extraction and states that she has two more doses remaining. The patient notes that her extraction site seemed to be improving when she went to bed, but she awoke early this morning with increased pain and swelling in her mouth and face. She last took Vicodin yesterday at 1900. She also reports pain with swallowing. She was afebrile on arrival to the emergency department.     Allergies:  No known drug allergies.     Medications:    Lithium   Vicodin   Amoxicillin     Past Medical History:    Anxiety   ASCUS (atypical squamous cells of undetermined significance) on gynecologic Papanicolaou smear complicating pregnancy, antepartum  Papanicolaou smear of cervix with low grade squamous intraepithelial lesion (LGSIL) (aka LSIL)  Depression   Streptococcus infection in conditions classified elsewhere and of unspecified site, group B  Cannabis abuse, continuous  Maternal gonorrhea complicating pregnancy, childbirth, or the puerperium, unspecified as to episode of care   labor    Past Surgical History:    History reviewed. No pertinent past surgical history.    Family History:    CAD - Maternal Grandmother     Social History:  Marital Status: Single  Presents to the ED alone  Tobacco Use: current some day smoker 0.5 ppd  Alcohol Use: no       Review of Systems   HENT: Positive for dental problem (pain and swelling).         Positive for pain with swallowing   All other systems reviewed and are negative.        Physical Exam   First Vitals:  BP: (!) 143/95  Pulse: 82  Heart Rate: 82  Temp: 98.3  F (36.8  C)  Resp: 18  SpO2: 97 %    Physical Exam  Constitutional:  Appears  well-developed and well-nourished. Alert. Conversant. Non toxic.       HENT:   Head: Atraumatic.   Nose: Nose normal.   Soft tissue swelling of her right cheek and right submandibular region. This is a palpable firm abscess just below her mandible on the right side. No trismus. Normal phonation. Total protrudes midline. Palate elevates symmetrically  Mouth/Throat: Oropharynx is clear and moist.   Eyes: Conjunctivae normal. EOM are grossly normal. Pupils are equal, round, and reactive to light. No scleral icterus.   Neck: Normal range of motion. No tracheal deviation present.   Cardiovascular: Normal rate, regular rhythm.  Pulmonary/Chest: Effort normal. No stridor. No respiratory distress.  Musculoskeletal: Normal. No other abnormality noted.   Neurological: Alert and oriented to person, place, and time. Normal strength. CN II-VII intact. No sensory deficit. GCS eye subscore is 4. GCS verbal subscore is 5. GCS motor subscore is 6. Normal coordination . Intact distal sensory and motor function.  Skin: Skin is warm and dry. No rash noted. No pallor. Normal capillary refill.  Psychiatric:  normal mood and affect.    Emergency Department Course     Imaging:  Soft tissue neck CT w contrast: IMPRESSION: 2 cm developing abscess in right submandibular region with  surrounding inflammation. Preliminary report was given by Dr. Lorenzo.      Laboratory:  CBC:  WBC 6.7, HGB 12.5, , otherwise WNL     BMP: WNL (Creatinine 0.75)     Interventions:  (0342) Ibuprofen, 600 mg, PO   (0425) Normal Saline, 1 liter, IV bolus    (0437) Reglan, 10 mg, IV   (0437) Benadryl, 25 mg, IV injection    (0606) Dilaudid, 0.5 mg, IV   (0631) Cleocin, 900 mg, IV    Emergency Department Course:  Nursing notes and vitals reviewed.  I performed an exam of the patient as documented above.   (3991) I consulted with Dr. Dejesus of Oral surgery at [facility] regarding the patient.  The patient was sent for a soft tissue neck CT  Findings and plan explained  to the patient. Patient will be seen at [facility] at 0800 for evaluation of her abscess.     Impression & Plan      Medical Decision Making:  Elissa Avila is a pleasant generally healthy 27 year old female who came into the ER today for swelling of the right side of her face and the right submandibular space. On clinical exam she has a clear right facial and submandibular cellulitis with a palpable abscess. She had a molar extracted by a  dentist office in [place] yesterday, but we were unable to contact them tonight to help arrange follow up. Therefore we contacted oral maxillofacial surgery at the [facility]. The surgeon requested a Ct scan to delineate the location and extent of her abscess. This was performed and does confirm an abscess. The surgeon believes that they will be able it in clinic today. Therefore we gave the patient a dose of IV clindamycin here, we will send her home on clindamycin and she will go directly form out ER to [facility] at [facility]. She will check in at 0800 this morning and be seen today for further cares. At this point, although she does have submandibular swelling, she has not trismus, patent airway, normal phonation, no concern for impending airway collapse or emergent Heladio's Angina. I think she is safe for outpatient transfer to the [facility].        Diagnosis:    ICD-10-CM    1. Submandibular abscess K12.2 CBC with platelets differential     Basic metabolic panel   2. Facial cellulitis L03.211        Disposition:  Patient will report to [facility] clinic at 0800 for further management.     Discharge Medications:  New Prescriptions    CLINDAMYCIN (CLEOCIN) 300 MG CAPSULE    Take 1 capsule (300 mg) by mouth 4 times daily for 10 days    HYDROCODONE-ACETAMINOPHEN (NORCO) 5-325 MG PER TABLET    Take 1-2 tablets by mouth every 4 hours as needed for moderate to severe pain         Ervin BAKER am serving as a scribe on 5/4/2017 at 3:50 AM to personally document services  performed by Dr. Menon based on my observations and the provider's statements to me.      5/4/2017   [facility] EMERGENCY DEPARTMENT       Holden Menon MD  05/08/17 3408

## 2017-05-04 NOTE — ED NOTES
Pt had tooth removed on right lower mandible yesterday. Awoke this morning with increase swelling and pain. Pt states she has not taken any IB profen or tylenol in adittion to her stated prescribed Vicodin.

## 2017-05-06 ENCOUNTER — TELEPHONE (OUTPATIENT)
Dept: NURSING | Facility: CLINIC | Age: 27
End: 2017-05-06

## 2017-05-06 ENCOUNTER — HOSPITAL ENCOUNTER (EMERGENCY)
Facility: CLINIC | Age: 27
Discharge: HOME | End: 2017-05-06
Attending: EMERGENCY MEDICINE | Admitting: EMERGENCY MEDICINE
Payer: COMMERCIAL

## 2017-05-06 VITALS
RESPIRATION RATE: 18 BRPM | DIASTOLIC BLOOD PRESSURE: 79 MMHG | SYSTOLIC BLOOD PRESSURE: 129 MMHG | TEMPERATURE: 98.4 F | HEART RATE: 90 BPM | OXYGEN SATURATION: 94 %

## 2017-05-06 DIAGNOSIS — K04.7 DENTAL ABSCESS: ICD-10-CM

## 2017-05-06 PROCEDURE — 99282 EMERGENCY DEPT VISIT SF MDM: CPT

## 2017-05-06 ASSESSMENT — ENCOUNTER SYMPTOMS: FEVER: 0

## 2017-05-06 NOTE — ED PROVIDER NOTES
History     Chief Complaint:  Post-op Problem (Dental)    GERALDO Avila is a 27 year old female who presents with a post-op dental problem. The patient had oral surgery to drain an oral abscess 2 days ago to the right bottom side of her mouth. The patient states that she has swelling and that it is difficult for her to eat. The patient denies fever or drainage from the incision site.    Allergies:  Contrast dye     Medications:    Clindamycin  Norco  Lithium     Past Medical History:    Anxiety   ASCUS (atypical squamous cells of undetermined significance) on gynecologic Papanicolaou smear complicating pregnancy, antepartum   ASCUS with positive high risk HPV  Cannabis abuse, continuous  Depressive disorder   Papanicolaou smear of cervix with low grade squamous intraepithelial lesion (LGSIL) (aka LSIL)    Past Surgical History:    History reviewed. No pertinent past surgical history.    Family History:    History reviewed. No pertinent family history.    Social History:  Marital Status: Single  Presents to the ED with family  Tobacco Use: 0.50 packs daily  PCP: Physician No Ref-Primary     Review of Systems   Constitutional: Negative for fever.   HENT: Positive for dental problem.    All other systems reviewed and are negative.    Physical Exam   First Vitals:  BP: 129/79  Pulse: 90  Temp: 98.4  F (36.9  C)  Resp: 18  SpO2: 94 %    Physical Exam  Constitutional:  Oriented to person, place, and time. Well appearing.  HENT:   Head:    Normocephalic.   Mouth/Throat:   Oropharynx is clear and moist. Mild facial swelling. Minimal submandibular swelling to right periapical packing on the right lower 2nd molar with no purulence or significant swelling.  Eyes:    EOM are normal. Pupils are equal, round, and reactive to light.   Neck:    Neck supple.   Cardiovascular:  Normal rate, regular rhythm and normal heart sounds.      Exam reveals no gallop and no friction rub.       No murmur heard.  Pulmonary/Chest:   Effort normal and breath sounds normal.      No respiratory distress. No wheezes. No rales.      No reproducible chest wall pain.  Abdominal:   Soft. No distension. No tenderness. No rebound and no guarding.   Musculoskeletal:  Normal range of motion.   Neurological:   Alert and oriented to person, place, and time.           Moves all 4 extremities spontaneously    Skin:    No rash noted. No pallor. No facial erythema.      Emergency Department Course   ED Course:  Nursing notes and past medical history reviewed.   I performed a physical examination of the patient as documented above.  I explained the plan with the patient who consents to this.  1142: I spoke to Dr. Blankenship of [facility] who states that the patient's symptoms are likely normal and recommended continuation of the post-operative plan and recommended return to [facility] of if her swelling worsens or if she develops new symptoms.  Findings and plan explained to the patient. Patient discharged home with instructions regarding supportive care, medications, and reasons to return. The importance of close follow-up was reviewed.     Impression & Plan    Medical Decision Making:  Elissa Avila is a 27 year old female who presents today after having a dental abscess 2 drained 2 days ago by oral surgery at the [facility]. She is here complaining continued pain with worsening swelling. On exam here, she has mild swelling noted along her mandible that is not  submandibular, she is able to talk to me comfortably, has mild trismus, has no difficulty swallowing. I did discuss the case with Dr. Blankenship of oral surgery at the [facility], who after further discussion, felt that this was normal on post-operative change. The patient did not need to be immediately evaluated or need repeated imaging, note that she has had 2 CT's in the past month, and I do want to reduce unnecessary radiation exposure. She is already on appropriate Clindamycin, pain  medication, NSAID's, which she is instructed to continue using. She has an appointment with oral surgery on Monday and was told to go to [facility] ED for oral surgery should she have any worsening swelling, trouble swallowing or breathing.    Diagnosis:    ICD-10-CM    1. Dental abscess K04.7        Disposition:   Discharge to home.    Dante ABKER am serving as a scribe on 5/6/2017 at 11:39 AM to personally document services performed by Stepan Abel MD, based on my observations and the provider's statements to me.       Stepan Abel MD  05/06/17 1508

## 2017-05-06 NOTE — ED AVS SNAPSHOT
[facility] Emergency Department    [address]    [facility] [address]    Phone:  [phone]    Fax:  [phone]                                       Elissa Avila   MRN: [id number]    Department:  [facility] Emergency Department   Date of Visit:  5/6/2017           After Visit Summary Signature Page     I have received my discharge instructions, and my questions have been answered. I have discussed any challenges I see with this plan with the nurse or doctor.    ..........................................................................................................................................  Patient/Patient Representative Signature      ..........................................................................................................................................  Patient Representative Print Name and Relationship to Patient    ..................................................               ................................................  Date                                            Time    ..........................................................................................................................................  Reviewed by Signature/Title    ...................................................              ..............................................  Date                                                            Time

## 2017-05-06 NOTE — ED NOTES
Discharge instructions reviewed with patient.   Patient verbalizes her understanding.   Followup care also reviewed.  DC to home per order.  All questions answered.

## 2017-05-06 NOTE — ED AVS SNAPSHOT
" [facility] Emergency Department    [address] [place] [address]    Phone:  [phone]    Fax:  [phone]                                       Elissa Avila   MRN: [id number]    Department:  [facility] Emergency Department   Date of Visit:  5/6/2017           Patient Information     Date Of Birth          1990        Your diagnoses for this visit were:     Dental abscess        You were seen by Stepan Abel MD.      Follow-up Information     Follow up with oral surgery. Go in 2 days.        Discharge Instructions         Dental Abscess with Facial Cellulitis    A dental abscess is an infection at the base of a tooth. It means a pocket of pus has formed at the tip of a tooth root in your jaw bone. If the infection isn t treated, it can spread to the gum near the tooth. This causes swelling and pain. More serious infections spread to the face. This causes your face to swell (cellulitis). This is a very serious condition. Once the swelling begins, it can spread quickly.  A dental abscess usually starts with a crack or cavity in a tooth. The pain is often made worse by drinking hot or cold beverages, or biting on hard foods. The pain may spread from the tooth to your ear or the area of your jaw on the same side.  Home care  Follow these tips when caring for yourself at home:    Avoid hot and cold foods and drinks. Your tooth may be sensitive to changes in temperature. Don t chew on the side of the infected tooth.    If your tooth is chipped or cracked, or if there is a large open cavity, put oil of cloves directly on the tooth to relieve pain. You can buy oil of cloves at drugstores. Some pharmacies carry an over-the-counter \"toothache kit.\" This contains a paste that you can put on the exposed tooth to make it less sensitive.    Put a cold pack on your jaw over the sore area to help reduce pain.    You may use acetaminophen or ibuprofen to ease pain, " unless another medicine was prescribed. Note: If you have chronic liver or kidney disease, talk with your health care provider before using these medicines. Also talk with your provider if you ve had a stomach ulcer or GI bleeding.     An antibiotic will be prescribed. Take it exactly as directed. Don t miss any doses.  Follow-up care  Follow up with your dentist or an oral surgeon as advised. Severe cases of cellulitis must be checked again within 24 hours. Once an infection occurs in a tooth, it will continue to be a problem until the infection is drained. This is done through surgery or a root canal. Or you may need to have your tooth pulled.  When to seek medical advice  Call your health care provider right away if any of these occur:    Swelling spreads to the upper half of your face or neck    You eyelids begin to swell shut    Pain gets worse or spreads to your neck    Fever of 100.4 F (38 C) or higher, or as directed by your health care provider    Unusual drowsiness    Headache or a stiff neck    Weakness or fainting    Difficulty swallowing or breathing       3483-3339 [employer]. [address] [place] [address]. All rights reserved. This information is not intended as a substitute for professional medical care. Always follow your healthcare professional's instructions.          24 Hour Appointment Hotline       To make an appointment at any [facility], call [phone] ([phone]). If you don't have a family doctor or clinic, we will help you find one. [facility] [place] clinics are conveniently located to serve the needs of you and your family.             Review of your medicines      Our records show that you are taking the medicines listed below. If these are incorrect, please call your family doctor or clinic.        Dose / Directions Last dose taken    clindamycin 300 MG capsule   Commonly known as:  CLEOCIN   Dose:  300 mg   Quantity:  40 capsule        Take 1 capsule  (300 mg) by mouth 4 times daily for 10 days   Refills:  0        HYDROcodone-acetaminophen 5-325 MG per tablet   Commonly known as:  NORCO   Dose:  1-2 tablet   Quantity:  15 tablet        Take 1-2 tablets by mouth every 4 hours as needed for moderate to severe pain   Refills:  0        LITHIUM PO        Refills:  0                Orders Needing Specimen Collection     None      Pending Results     No orders found from 5/4/2017 to 5/7/2017.            Pending Culture Results     No orders found from 5/4/2017 to 5/7/2017.            Pending Results Instructions     If you had any lab results that were not finalized at the time of your Discharge, you can call the ED Lab Result RN at [phone]. You will be contacted by this team for any positive Lab results or changes in treatment. The nurses are available 7 days a week from 10A to 6:30P.  You can leave a message 24 hours per day and they will return your call.        Test Results From Your Hospital Stay               Clinical Quality Measure: Blood Pressure Screening     Your blood pressure was checked while you were in the emergency department today. The last reading we obtained was  BP: 129/79 . Please read the guidelines below about what these numbers mean and what you should do about them.  If your systolic blood pressure (the top number) is less than 120 and your diastolic blood pressure (the bottom number) is less than 80, then your blood pressure is normal. There is nothing more that you need to do about it.  If your systolic blood pressure (the top number) is 120-139 or your diastolic blood pressure (the bottom number) is 80-89, your blood pressure may be higher than it should be. You should have your blood pressure rechecked within a year by a primary care provider.  If your systolic blood pressure (the top number) is 140 or greater or your diastolic blood pressure (the bottom number) is 90 or greater, you may have high blood pressure. High blood pressure  "is treatable, but if left untreated over time it can put you at risk for heart attack, stroke, or kidney failure. You should have your blood pressure rechecked by a primary care provider within the next 4 weeks.  If your provider in the emergency department today gave you specific instructions to follow-up with your doctor or provider even sooner than that, you should follow that instruction and not wait for up to 4 weeks for your follow-up visit.        Thank you for choosing [facility]       Thank you for choosing [facility] for your care. Our goal is always to provide you with excellent care. Hearing back from our patients is one way we can continue to improve our services. Please take a few minutes to complete the written survey that you may receive in the mail after you visit with us. Thank you!        [facility] Information     [facility] lets you send messages to your doctor, view your test results, renew your prescriptions, schedule appointments and more. To sign up, go to [email or web address] . Click on \"Log in\" on the left side of the screen, which will take you to the Welcome page. Then click on \"Sign up Now\" on the right side of the page.     You will be asked to enter the access code listed below, as well as some personal information. Please follow the directions to create your username and password.     Your access code is: [email or web address]  Expires: 2017  4:47 PM     Your access code will  in 90 days. If you need help or a new code, please call your [facility] clinic or [phone].        Care EveryWhere ID     This is your Care EveryWhere ID. This could be used by other organizations to access your [facility] medical records  [id number]        After Visit Summary       This is your record. Keep this with you and show to your community pharmacist(s) and doctor(s) at your next visit.                  "

## 2017-05-06 NOTE — DISCHARGE INSTRUCTIONS
"  Dental Abscess with Facial Cellulitis    A dental abscess is an infection at the base of a tooth. It means a pocket of pus has formed at the tip of a tooth root in your jaw bone. If the infection isn t treated, it can spread to the gum near the tooth. This causes swelling and pain. More serious infections spread to the face. This causes your face to swell (cellulitis). This is a very serious condition. Once the swelling begins, it can spread quickly.  A dental abscess usually starts with a crack or cavity in a tooth. The pain is often made worse by drinking hot or cold beverages, or biting on hard foods. The pain may spread from the tooth to your ear or the area of your jaw on the same side.  Home care  Follow these tips when caring for yourself at home:    Avoid hot and cold foods and drinks. Your tooth may be sensitive to changes in temperature. Don t chew on the side of the infected tooth.    If your tooth is chipped or cracked, or if there is a large open cavity, put oil of cloves directly on the tooth to relieve pain. You can buy oil of cloves at [employer]. Some pharmacies carry an over-the-counter \"toothache kit.\" This contains a paste that you can put on the exposed tooth to make it less sensitive.    Put a cold pack on your jaw over the sore area to help reduce pain.    You may use acetaminophen or ibuprofen to ease pain, unless another medicine was prescribed. Note: If you have chronic liver or kidney disease, talk with your health care provider before using these medicines. Also talk with your provider if you ve had a stomach ulcer or GI bleeding.     An antibiotic will be prescribed. Take it exactly as directed. Don t miss any doses.  Follow-up care  Follow up with your dentist or an oral surgeon as advised. Severe cases of cellulitis must be checked again within 24 hours. Once an infection occurs in a tooth, it will continue to be a problem until the infection is drained. This is done through surgery " or a root canal. Or you may need to have your tooth pulled.  When to seek medical advice  Call your health care provider right away if any of these occur:    Swelling spreads to the upper half of your face or neck    You eyelids begin to swell shut    Pain gets worse or spreads to your neck    Fever of 100.4 F (38 C) or higher, or as directed by your health care provider    Unusual drowsiness    Headache or a stiff neck    Weakness or fainting    Difficulty swallowing or breathing       8333-8241 [employer]. [address] [place] [address]. All rights reserved. This information is not intended as a substitute for professional medical care. Always follow your healthcare professional's instructions.

## 2017-05-06 NOTE — ED NOTES
Pt has left sided facial swelling reports she had abcess drained a few days ago [facility] oral surgeons currently taking antibiotic and norco for pain last dose 0700. Airway intact no respiratory distress noted in triage.

## 2017-05-11 ENCOUNTER — HOSPITAL ENCOUNTER (EMERGENCY)
Facility: CLINIC | Age: 27
Discharge: HOME | End: 2017-05-11
Attending: EMERGENCY MEDICINE | Admitting: EMERGENCY MEDICINE
Payer: COMMERCIAL

## 2017-05-11 VITALS
DIASTOLIC BLOOD PRESSURE: 85 MMHG | OXYGEN SATURATION: 100 % | HEART RATE: 95 BPM | WEIGHT: 151 LBS | BODY MASS INDEX: 27.62 KG/M2 | RESPIRATION RATE: 18 BRPM | SYSTOLIC BLOOD PRESSURE: 128 MMHG | TEMPERATURE: 98.5 F

## 2017-05-11 DIAGNOSIS — R22.0 FACIAL SWELLING: ICD-10-CM

## 2017-05-11 DIAGNOSIS — B37.31 CANDIDAL VAGINITIS: ICD-10-CM

## 2017-05-11 LAB
HCG UR QL: NEGATIVE
MICRO REPORT STATUS: ABNORMAL
SPECIMEN SOURCE: ABNORMAL
WET PREP SPEC: ABNORMAL

## 2017-05-11 PROCEDURE — 87591 N.GONORRHOEAE DNA AMP PROB: CPT | Performed by: EMERGENCY MEDICINE

## 2017-05-11 PROCEDURE — 99283 EMERGENCY DEPT VISIT LOW MDM: CPT

## 2017-05-11 PROCEDURE — 81025 URINE PREGNANCY TEST: CPT | Performed by: EMERGENCY MEDICINE

## 2017-05-11 PROCEDURE — 87210 SMEAR WET MOUNT SALINE/INK: CPT | Performed by: EMERGENCY MEDICINE

## 2017-05-11 PROCEDURE — 25000132 ZZH RX MED GY IP 250 OP 250 PS 637: Performed by: EMERGENCY MEDICINE

## 2017-05-11 PROCEDURE — 87491 CHLMYD TRACH DNA AMP PROBE: CPT | Performed by: EMERGENCY MEDICINE

## 2017-05-11 RX ORDER — FLUCONAZOLE 150 MG/1
150 TABLET ORAL ONCE
Status: COMPLETED | OUTPATIENT
Start: 2017-05-11 | End: 2017-05-11

## 2017-05-11 RX ADMIN — FLUCONAZOLE 150 MG: 150 TABLET ORAL at 12:39

## 2017-05-11 ASSESSMENT — ENCOUNTER SYMPTOMS
DIFFICULTY URINATING: 0
ABDOMINAL PAIN: 0

## 2017-05-11 NOTE — ED PROVIDER NOTES
"  History     Chief Complaint:  Vaginal Discharge    HPI   Elissa Avila is a 27 year old female who presents with vaginal discharge. Patient has recently stopped antibiotic use 2 days ago for a mouth abscess. She has been having the vaginal discharge since that time. She describes it as \"pinkish and chunky\". She notices it when she wipes. She also complains of some vaginal irritation. She has had a recent period. Denies abdominal pain or difficulty urinating.    Allergies:  Contrast dye     Medications:    Cleocin  Norco  Lithium    Past Medical History:    Anxiety  ASCUS  Depression    Past Surgical History:    Gyn surgery    Family History:    CAD  HTN    Social History:  Relationship status:   Tobacco use: Positive (0.25 ppd)  Alcohol use: Negative  The patient presents with  and baby.     Review of Systems   Gastrointestinal: Negative for abdominal pain.   Genitourinary: Positive for vaginal discharge. Negative for difficulty urinating.   All other systems reviewed and are negative.        Physical Exam   First Vitals:  BP: 132/88  Pulse: 95  Temp: 98.5  F (36.9  C)  Resp: 18  Weight: 68.5 kg (151 lb)  SpO2: 100 %    Physical Exam  General: The patient is alert, in no respiratory distress.    HENT: Mucous membranes moist. Some swelling around recent dental extraction on right jaw.    Cardiovascular: Regular rate and rhythm. Good pulses in all four extremities. Normal capillary refill and skin turgor.     Respiratory: Lungs are clear. No nasal flaring. No retractions. No wheezing, no crackles.    Gastrointestinal: Abdomen soft. No guarding, no rebound. No palpable hernias.     Musculoskeletal: No gross deformity.     Skin: No rashes or petechiae.     Neurologic: The patient is alert and oriented x3. GCS 15. No testable cranial nerve deficit. Follows commands with clear and appropriate speech. Gives appropriate answers. Good strength in all extremities. No gross neurologic deficit. Gross " sensation intact. Pupils are round and reactive. No meningismus.     Lymphology: No cervical adenopathy. No lower extremity swelling.    Psychiatric: The patient is non-tearful.      Emergency Department Course     Laboratory:  HCG Qualitative urine: Negative  Wet Prep: Few PMN's seen; Few yeast seen; No clue cells seen; No Trichomonas seen;  Neisseria Gonorrhea PCR: Pending  Chlamydia Trachomatis PCR: Pending    Interventions:  1239: Diflucan, 150 mg, PO    Emergency Department Course:  Nursing notes and vitals reviewed.  I performed an exam of the patient as documented above.  The above workup was undertaken.  1228: I rechecked the patient and discussed results.    Findings and plan explained to the Patient. Patient discharged home, status improved, with instructions regarding supportive care, medications, and reasons to return as well as the importance of close follow-up was reviewed.      Impression & Plan      Medical Decision Making:  Elissa Avila is a 27 year old female who presented after 2 courses of antibiotics following a dental abscess. The tooth has successfully been extracted. She notes some facial swelling, but I do not see anything in the area suggestive of a current abscess. I think that likely led to her vaginosis, which I was suspicious about being candidal, which it in fact is. Patient has had no abdominal pain. There was concern as to whether she might be pregnant. Therefore, I did order a pregnancy test which was negative. She was treated with Diflucan here, and discharged in good condition.    Diagnosis:    ICD-10-CM   1. Candidal vaginitis B37.3   2. Facial swelling R22.0     Disposition:  Discharge to home with primary care follow up.    I, Derrick Etienne, am serving as a scribe on 5/11/2017 at 11:08 AM to personally document services performed by Skip Aparicio MD, based on my observations and the provider's statements to me.    [facility] EMERGENCY  DEPARTMENT       Skip Aparicio MD  05/12/17 1015

## 2017-05-11 NOTE — ED NOTES
Pt here with vaginal discharge for the past couple days, denies pain or odor. Pt was recently on antibiotics for a mouth abcess.

## 2017-05-11 NOTE — ED AVS SNAPSHOT
[facility] Emergency Department    [address]    [facility] [address]    Phone:  [phone]    Fax:  [phone]                                       Elissa Avila   MRN: [id number]    Department:  [facility] Emergency Department   Date of Visit:  5/11/2017           After Visit Summary Signature Page     I have received my discharge instructions, and my questions have been answered. I have discussed any challenges I see with this plan with the nurse or doctor.    ..........................................................................................................................................  Patient/Patient Representative Signature      ..........................................................................................................................................  Patient Representative Print Name and Relationship to Patient    ..................................................               ................................................  Date                                            Time    ..........................................................................................................................................  Reviewed by Signature/Title    ...................................................              ..............................................  Date                                                            Time

## 2017-05-11 NOTE — DISCHARGE INSTRUCTIONS
Vaginal Infection: Yeast (Candidiasis)  Yeast infection occurs when yeast in the vagina increase and start attacking the vaginal tissues. Yeast is a type of fungus. These infections are often caused by a type of yeast called Candida albicans. Other species of yeast can also cause infections. Factors that may make infection more likely include recent antibiotic use, douching, or increased sex. Yeast infections are more common in women who have diabetes, or are obese or pregnant, or have a weak immune system.  Symptoms of yeast infection    Clumpy or thin, white discharge, which may look like cottage cheese    No odor or minimal odor    Severe vaginal itching or burning    Burning with urination    Swelling, redness of vulva    Pain during sex  Treating yeast infection  Yeast infection is treated with a vaginal antifungal cream. In some cases, antifungal pills are prescribed instead. During treatment:    Finish all of your medicine, even if your symptoms go away.    Apply the cream before going to bed. Lie flat after applying so that it doesn't drip out.    Do not douche or use tampons.    Don't rely on a diaphragm or condoms, since the cream may weaken them.    Avoid intercourse if advised by your healthcare provider.     Should I treat a yeast infection myself?  Discuss with your healthcare provider whether you should use over-the-counter medicines to treat a yeast infection. Self-treatment may depend on whether:    You've had a yeast infection in the past.    You're at risk for STDs.  Call your healthcare provider if symptoms do not go away or come back after treatment.     3234-1649 [employer]. [address] [place] [address], [place] [address]. All rights reserved. This information is not intended as a substitute for professional medical care. Always follow your healthcare professional's instructions.

## 2017-05-11 NOTE — ED AVS SNAPSHOT
[facility] Emergency Department    [address] [place] [address]    Phone:  [phone]    Fax:  [phone]                                       Elissa Avila   MRN: [id number]    Department:  [facility] Emergency Department   Date of Visit:  5/11/2017           Patient Information     Date Of Birth          1990        Your diagnoses for this visit were:     Candidal vaginitis     Facial swelling        You were seen by Skip Aparicio MD.      Follow-up Information     Follow up with your doctor or [facility]. Schedule an appointment as soon as possible for a visit in 5 days.        Discharge Instructions         Vaginal Infection: Yeast (Candidiasis)  Yeast infection occurs when yeast in the vagina increase and start attacking the vaginal tissues. Yeast is a type of fungus. These infections are often caused by a type of yeast called Candida albicans. Other species of yeast can also cause infections. Factors that may make infection more likely include recent antibiotic use, douching, or increased sex. Yeast infections are more common in women who have diabetes, or are obese or pregnant, or have a weak immune system.  Symptoms of yeast infection    Clumpy or thin, white discharge, which may look like cottage cheese    No odor or minimal odor    Severe vaginal itching or burning    Burning with urination    Swelling, redness of vulva    Pain during sex  Treating yeast infection  Yeast infection is treated with a vaginal antifungal cream. In some cases, antifungal pills are prescribed instead. During treatment:    Finish all of your medicine, even if your symptoms go away.    Apply the cream before going to bed. Lie flat after applying so that it doesn't drip out.    Do not douche or use tampons.    Don't rely on a diaphragm or condoms, since the cream may weaken them.    Avoid intercourse if advised by your healthcare provider.     Should I  treat a yeast infection myself?  Discuss with your healthcare provider whether you should use over-the-counter medicines to treat a yeast infection. Self-treatment may depend on whether:    You've had a yeast infection in the past.    You're at risk for STDs.  Call your healthcare provider if symptoms do not go away or come back after treatment.     3180-9376 [employer]. [address] [place] [address]. All rights reserved. This information is not intended as a substitute for professional medical care. Always follow your healthcare professional's instructions.          24 Hour Appointment Hotline       To make an appointment at any [facility], call [phone] ([phone]). If you don't have a family doctor or clinic, we will help you find one. [facility] clinics are conveniently located to serve the needs of you and your family.             Review of your medicines      Our records show that you are taking the medicines listed below. If these are incorrect, please call your family doctor or clinic.        Dose / Directions Last dose taken    clindamycin 300 MG capsule   Commonly known as:  CLEOCIN   Dose:  300 mg   Quantity:  40 capsule        Take 1 capsule (300 mg) by mouth 4 times daily for 10 days   Refills:  0        HYDROcodone-acetaminophen 5-325 MG per tablet   Commonly known as:  NORCO   Dose:  1-2 tablet   Quantity:  15 tablet        Take 1-2 tablets by mouth every 4 hours as needed for moderate to severe pain   Refills:  0        LITHIUM PO        Refills:  0                Procedures and tests performed during your visit     Chlamydia trachomatis PCR    HCG qualitative urine    NO Rho (D) immune globulin (RhoGam) needed - NOT obstetric patient    Neisseria gonorrhoea PCR    Prep for procedure - pelvic exam    Wet prep      Orders Needing Specimen Collection     None      Pending Results     Date and Time Order Name Status Description    5/11/2017 1113 Neisseria  gonorrhoea PCR In process     5/11/2017 1113 Chlamydia trachomatis PCR In process             Pending Culture Results     Date and Time Order Name Status Description    5/11/2017 1113 Neisseria gonorrhoea PCR In process     5/11/2017 1113 Chlamydia trachomatis PCR In process             Pending Results Instructions     If you had any lab results that were not finalized at the time of your Discharge, you can call the ED Lab Result RN at [phone]. You will be contacted by this team for any positive Lab results or changes in treatment. The nurses are available 7 days a week from 10A to 6:30P.  You can leave a message 24 hours per day and they will return your call.        Test Results From Your Hospital Stay        5/11/2017 12:17 PM      Component Results     Component    Specimen Description    Vagina    Wet Prep (Abnormal)    Few PMNs seen  Few Yeast seen  No Trichomonas seen  No clue cells seen      Micro Report Status    FINAL 05/11/2017 5/11/2017 12:00 PM         5/11/2017 12:00 PM         5/11/2017 11:58 AM      Component Results     Component Value Ref Range & Units Status    HCG Qual Urine Negative NEG Final                Clinical Quality Measure: Blood Pressure Screening     Your blood pressure was checked while you were in the emergency department today. The last reading we obtained was  BP: 132/88 . Please read the guidelines below about what these numbers mean and what you should do about them.  If your systolic blood pressure (the top number) is less than 120 and your diastolic blood pressure (the bottom number) is less than 80, then your blood pressure is normal. There is nothing more that you need to do about it.  If your systolic blood pressure (the top number) is 120-139 or your diastolic blood pressure (the bottom number) is 80-89, your blood pressure may be higher than it should be. You should have your blood pressure rechecked within a year by a primary care provider.  If your systolic  "blood pressure (the top number) is 140 or greater or your diastolic blood pressure (the bottom number) is 90 or greater, you may have high blood pressure. High blood pressure is treatable, but if left untreated over time it can put you at risk for heart attack, stroke, or kidney failure. You should have your blood pressure rechecked by a primary care provider within the next 4 weeks.  If your provider in the emergency department today gave you specific instructions to follow-up with your doctor or provider even sooner than that, you should follow that instruction and not wait for up to 4 weeks for your follow-up visit.        Thank you for choosing [facility]       Thank you for choosing [facility] for your care. Our goal is always to provide you with excellent care. Hearing back from our patients is one way we can continue to improve our services. Please take a few minutes to complete the written survey that you may receive in the mail after you visit with us. Thank you!        [employer] Information     [employer] lets you send messages to your doctor, view your test results, renew your prescriptions, schedule appointments and more. To sign up, go to [email or web address] . Click on \"Log in\" on the left side of the screen, which will take you to the Welcome page. Then click on \"Sign up Now\" on the right side of the page.     You will be asked to enter the access code listed below, as well as some personal information. Please follow the directions to create your username and password.     Your access code is: [email or web address]  Expires: 2017  4:47 PM     Your access code will  in 90 days. If you need help or a new code, please call your [facility] clinic or [phone].        Care EveryWhere ID     This is your Care EveryWhere ID. This could be used by other organizations to access your [facility] medical records  [id number]        After Visit Summary       This is your record. Keep this with you and show to your " community pharmacist(s) and doctor(s) at your next visit.

## 2021-06-02 ENCOUNTER — RECORDS - HEALTHEAST (OUTPATIENT)
Dept: ADMINISTRATIVE | Facility: CLINIC | Age: 31
End: 2021-06-02

## 2023-03-14 NOTE — ED NOTES
Pt continues to have right eye swelling, denies it getting any worse.  Denies difficulty breathing or swallowing.   [phone]

## 2024-04-23 ENCOUNTER — OFFICE VISIT (OUTPATIENT)
Dept: URGENT CARE | Facility: URGENT CARE | Age: 34
End: 2024-04-23
Payer: COMMERCIAL

## 2024-04-23 VITALS
HEART RATE: 92 BPM | OXYGEN SATURATION: 100 % | DIASTOLIC BLOOD PRESSURE: 90 MMHG | BODY MASS INDEX: 32.92 KG/M2 | WEIGHT: 180 LBS | SYSTOLIC BLOOD PRESSURE: 140 MMHG | TEMPERATURE: 98.6 F

## 2024-04-23 DIAGNOSIS — R07.0 THROAT PAIN: Primary | ICD-10-CM

## 2024-04-23 LAB — DEPRECATED S PYO AG THROAT QL EIA: NEGATIVE

## 2024-04-23 PROCEDURE — 87651 STREP A DNA AMP PROBE: CPT | Performed by: PHYSICIAN ASSISTANT

## 2024-04-23 PROCEDURE — 99203 OFFICE O/P NEW LOW 30 MIN: CPT | Performed by: PHYSICIAN ASSISTANT

## 2024-04-23 NOTE — PROGRESS NOTES
Chief Complaint   Patient presents with    Urgent Care    Pharyngitis     ON AND OFF SORE THROAT [id number]. NO OTHER SYMPTOMS.       ASSESSMENT/PLAN:  Elissa was seen today for urgent care and pharyngitis.    Diagnoses and all orders for this visit:    Throat pain  -     Streptococcus A Rapid Screen w/Reflex to PCR - Clinic Collect    Strep negative.  Likely viral versus acid reflux reassuring exam and vitals  Symptomatic cares and expected length of symptoms discussed at length and outlined in AVS  Return precautions also discussed    Arcadio Beard PA-C      SUBJECTIVE:  Elissa is a 34 year old female who presents to urgent care with 2 weeks of on and off sore throat.  It feels dry and irritable.  Able to eat and drink okay.  Feels otherwise well.  Does have a history of acid reflux and currently not very well-controlled..    ROS: Pertinent ROS neg other than the symptoms noted above in the HPI.     OBJECTIVE:  BP (!) 140/90   Pulse 92   Temp 98.6  F (37  C) (Tympanic)   Wt 81.6 kg (180 lb)   SpO2 100%   BMI 32.92 kg/m     GENERAL: alert and no distress  EYES: Eyes grossly normal to inspection, PERRL and conjunctivae and sclerae normal  HENT: ear canals and TM's normal, nose and mouth without ulcers or lesions, no significant oropharynx erythema  NECK: no adenopathy, no asymmetry, masses, or scars    DIAGNOSTICS    No results found for any visits on 04/23/24.     Current Outpatient Medications   Medication Sig Dispense Refill    HYDROcodone-acetaminophen (NORCO) 5-325 MG per tablet Take 1-2 tablets by mouth every 4 hours as needed for moderate to severe pain (Patient not taking: Reported on 4/23/2024) 15 tablet 0    LITHIUM PO  (Patient not taking: Reported on 4/23/2024)       No current facility-administered medications for this visit.      Patient Active Problem List   Diagnosis    Streptococcus infection in conditions classified elsewhere and of unspecified site, group B    Encounter for supervision  of other normal pregnancy    Cannabis abuse, continuous    Maternal gonorrhea complicating pregnancy, childbirth, or the puerperium, unspecified as to episode of care    Abdominal pain    Encounter for triage in pregnant patient    ASCUS with positive high risk HPV     labor    Indication for care in labor or delivery    Vaginal discharge      Past Medical History:   Diagnosis Date    Anxiety     ASCUS (atypical squamous cells of undetermined significance) on gynecologic Papanicolaou smear complicating pregnancy, antepartum 13    low risk HPV    ASCUS with positive high risk HPV 05/06/15    Depression     started with Pregnancy    Depressive disorder     Papanicolaou smear of cervix with low grade squamous intraepithelial lesion (LGSIL) (aka LSIL) 04     Past Surgical History:   Procedure Laterality Date    GYN SURGERY      NO HISTORY OF SURGERY       Family History   Problem Relation Age of Onset    Family History Negative Unknown     C.A.D. Maternal Grandmother         CHF    Hypertension Maternal Grandmother     Hypertension Maternal Grandfather     C.A.D. Maternal Grandfather         enlarged heart    Hypertension Paternal Grandmother      Social History     Tobacco Use    Smoking status: Some Days     Current packs/day: 0.25     Types: Cigarettes    Smokeless tobacco: Not on file   Substance Use Topics    Alcohol use: No              The plan of care was discussed with the patient. They understand and agree with the course of treatment prescribed. A printed summary was given including instructions and medications.  The use of Dragon/[employer] dictation services may have been used to construct the content in this note; any grammatical or spelling errors are non-intentional. Please contact the author of this note directly if you are in need of any clarification.

## 2024-04-24 LAB — GROUP A STREP BY PCR: NOT DETECTED

## 2024-12-26 ENCOUNTER — LAB REQUISITION (OUTPATIENT)
Dept: LAB | Facility: CLINIC | Age: 34
End: 2024-12-26
Payer: COMMERCIAL

## 2024-12-26 DIAGNOSIS — Z11.3 ENCOUNTER FOR SCREENING FOR INFECTIONS WITH A PREDOMINANTLY SEXUAL MODE OF TRANSMISSION: ICD-10-CM

## 2024-12-26 LAB — HIV 1+2 AB+HIV1 P24 AG SERPL QL IA: NONREACTIVE

## 2024-12-26 PROCEDURE — 87591 N.GONORRHOEAE DNA AMP PROB: CPT | Mod: ORL | Performed by: NURSE PRACTITIONER

## 2024-12-26 PROCEDURE — 86780 TREPONEMA PALLIDUM: CPT | Mod: ORL | Performed by: NURSE PRACTITIONER

## 2024-12-26 PROCEDURE — 87389 HIV-1 AG W/HIV-1&-2 AB AG IA: CPT | Mod: ORL | Performed by: NURSE PRACTITIONER

## 2024-12-26 PROCEDURE — 87491 CHLMYD TRACH DNA AMP PROBE: CPT | Mod: ORL | Performed by: NURSE PRACTITIONER

## 2024-12-27 LAB
C TRACH DNA SPEC QL PROBE+SIG AMP: NEGATIVE
N GONORRHOEA DNA SPEC QL NAA+PROBE: NEGATIVE
T PALLIDUM AB SER QL: NONREACTIVE